# Patient Record
Sex: MALE | Race: WHITE | Employment: UNEMPLOYED | URBAN - METROPOLITAN AREA
[De-identification: names, ages, dates, MRNs, and addresses within clinical notes are randomized per-mention and may not be internally consistent; named-entity substitution may affect disease eponyms.]

---

## 2017-08-08 ENCOUNTER — TELEPHONE (OUTPATIENT)
Dept: FAMILY MEDICINE | Facility: CLINIC | Age: 22
End: 2017-08-08

## 2017-08-08 DIAGNOSIS — E10.9 TYPE 1 DIABETES MELLITUS WITHOUT COMPLICATION (H): ICD-10-CM

## 2017-08-08 NOTE — TELEPHONE ENCOUNTER
Novolog         Last Written Prescription Date: 06/20/2016  Last Fill Quantity: 3, # refills: 3  Last Office Visit with [facility], [facility] or [facility] prescribing provider:  10/28/2016        BP Readings from Last 3 Encounters:   10/28/16 138/84   10/07/16 128/84   08/19/16 111/72     Lab Results   Component Value Date    MICROL 11 05/20/2016     Lab Results   Component Value Date    UMALCR 5.27 05/20/2016     Creatinine   Date Value Ref Range Status   08/18/2016 0.76 0.66 - 1.25 mg/dL Final   ]  GFR Estimate   Date Value Ref Range Status   08/18/2016 >90  Non  GFR Calc   >60 mL/min/1.7m2 Final   05/20/2016 >90  Non  GFR Calc   >60 mL/min/1.7m2 Final   10/15/2015 >90  Non  GFR Calc   >60 mL/min/1.7m2 Final     GFR Estimate If Black   Date Value Ref Range Status   08/18/2016 >90   GFR Calc   >60 mL/min/1.7m2 Final   05/20/2016 >90   GFR Calc   >60 mL/min/1.7m2 Final   10/15/2015 >90   GFR Calc   >60 mL/min/1.7m2 Final     Lab Results   Component Value Date    CHOL 194 10/28/2016     Lab Results   Component Value Date    HDL 50 10/28/2016     Lab Results   Component Value Date     10/28/2016     Lab Results   Component Value Date    TRIG 63 10/28/2016     Lab Results   Component Value Date    CHOLHDLRATIO 4.0 09/03/2013     No results found for: AST  Lab Results   Component Value Date    ALT 22 05/20/2016     Lab Results   Component Value Date    A1C 7.3 10/28/2016    A1C 8.6 05/20/2016    A1C 8.6 09/21/2015    A1C 9.3 01/12/2015    A1C 9.6 09/08/2014     Potassium   Date Value Ref Range Status   08/18/2016 4.2 3.4 - 5.3 mmol/L Final       Lc BRANNON (R)

## 2017-08-10 NOTE — TELEPHONE ENCOUNTER
Dad notified this has been sent and that Tree will need to be seen before that runs out. He expressed understanding.    Marisa Holbrook RN

## 2017-08-10 NOTE — TELEPHONE ENCOUNTER
"Dad calling stating pt is almost out and dad is \"near [facility] and I guess I'll be hanging out here\".    [facility]     "

## 2017-10-28 DIAGNOSIS — E10.9 TYPE 1 DIABETES MELLITUS WITHOUT COMPLICATION (H): ICD-10-CM

## 2017-11-17 ENCOUNTER — TELEPHONE (OUTPATIENT)
Dept: FAMILY MEDICINE | Facility: CLINIC | Age: 22
End: 2017-11-17

## 2017-11-17 DIAGNOSIS — E10.9 TYPE 1 DIABETES MELLITUS WITHOUT COMPLICATION (H): ICD-10-CM

## 2017-11-17 DIAGNOSIS — Z13.6 CARDIOVASCULAR SCREENING; LDL GOAL LESS THAN 100: ICD-10-CM

## 2017-11-17 RX ORDER — SYRINGE-NEEDLE,INSULIN,0.5 ML 31 GX5/16"
SYRINGE, EMPTY DISPOSABLE MISCELLANEOUS
Qty: 900 EACH | Refills: 0 | Status: SHIPPED | OUTPATIENT
Start: 2017-11-17 | End: 2017-12-08

## 2017-11-17 NOTE — TELEPHONE ENCOUNTER
Spoke with pt and he states that he has been taking Levemir 41 units BID for over 6 months.  BS have been averaging 172 over the last 7 days and 198 over 30 day average.      Pt asking for refill of Levemir with this new dosing along with refill of Novolog. Pt scheduled in clinic with Dr. Chiu to discuss his current dosing of medications and for long-term refills.      Please advise.    Amirah ROBLERO RN

## 2017-11-17 NOTE — TELEPHONE ENCOUNTER
Pt calling stating he is going to run out of this soon. He made an appt with Dr Chiu on Friday Dec 1 at 240. He was wondering if we can give him enough until his appt.    insulin aspart (NOVOLOG VIAL) 100 UNITS/ML injection           Last Written Prescription Date: 10/31/17  Last Fill Quantity: 20 ml, # refills: 0  [facility] Visit with [facility], [facility] or [facility] prescribing provider:  10/28/16   Next 5 appointments (look out 90 days)     Dec 01, 2017  2:40 PM CST   [facility]    [address] [facility] [address]   [phone]                   BP Readings from Last 3 Encounters:   10/28/16 138/84   10/07/16 128/84   08/19/16 111/72     Lab Results   Component Value Date    MICROL 11 05/20/2016     Lab Results   Component Value Date    UMALCR 5.27 05/20/2016     Creatinine   Date Value Ref Range Status   08/18/2016 0.76 0.66 - 1.25 mg/dL Final   ]  GFR Estimate   Date Value Ref Range Status   08/18/2016 >90  Non  GFR Calc   >60 mL/min/1.7m2 Final   05/20/2016 >90  Non  GFR Calc   >60 mL/min/1.7m2 Final   10/15/2015 >90  Non  GFR Calc   >60 mL/min/1.7m2 Final     GFR Estimate If Black   Date Value Ref Range Status   08/18/2016 >90   GFR Calc   >60 mL/min/1.7m2 Final   05/20/2016 >90   GFR Calc   >60 mL/min/1.7m2 Final   10/15/2015 >90   GFR Calc   >60 mL/min/1.7m2 Final     Lab Results   Component Value Date    CHOL 194 10/28/2016     Lab Results   Component Value Date    HDL 50 10/28/2016     Lab Results   Component Value Date     10/28/2016     Lab Results   Component Value Date    TRIG 63 10/28/2016     Lab Results   Component Value Date    CHOLHDLRATIO 4.0 09/03/2013     No results found for: AST  Lab Results   Component Value Date    ALT 22 05/20/2016     Lab Results   Component Value Date    A1C 7.3 10/28/2016    A1C 8.6  05/20/2016    A1C 8.6 09/21/2015    A1C 9.3 01/12/2015    A1C 9.6 09/08/2014     Potassium   Date Value Ref Range Status   08/18/2016 4.2 3.4 - 5.3 mmol/L Final         [facility]

## 2017-11-17 NOTE — TELEPHONE ENCOUNTER
Refills are done but be has not seen me in over one year. If he wants me to follow this he needs a clinic appt. .Alfredo Chiu

## 2017-12-02 DIAGNOSIS — Z13.6 CARDIOVASCULAR SCREENING; LDL GOAL LESS THAN 100: ICD-10-CM

## 2017-12-06 ENCOUNTER — APPOINTMENT (OUTPATIENT)
Dept: OCCUPATIONAL MEDICINE | Facility: CLINIC | Age: 22
End: 2017-12-06

## 2017-12-06 PROCEDURE — 99000 SPECIMEN HANDLING OFFICE-LAB: CPT | Performed by: PHYSICIAN ASSISTANT

## 2017-12-07 RX ORDER — INSULIN DETEMIR 100 [IU]/ML
INJECTION, SOLUTION SUBCUTANEOUS
Qty: 60 ML | Refills: 1 | Status: SHIPPED | OUTPATIENT
Start: 2017-12-07 | End: 2017-12-08

## 2017-12-08 ENCOUNTER — OFFICE VISIT (OUTPATIENT)
Dept: FAMILY MEDICINE | Facility: CLINIC | Age: 22
End: 2017-12-08
Payer: COMMERCIAL

## 2017-12-08 VITALS
TEMPERATURE: 99.3 F | DIASTOLIC BLOOD PRESSURE: 86 MMHG | BODY MASS INDEX: 36.88 KG/M2 | SYSTOLIC BLOOD PRESSURE: 141 MMHG | HEART RATE: 101 BPM | WEIGHT: 249 LBS | HEIGHT: 69 IN

## 2017-12-08 DIAGNOSIS — Z13.6 CARDIOVASCULAR SCREENING; LDL GOAL LESS THAN 100: ICD-10-CM

## 2017-12-08 DIAGNOSIS — I10 ESSENTIAL HYPERTENSION: ICD-10-CM

## 2017-12-08 DIAGNOSIS — E10.9 TYPE 1 DIABETES MELLITUS WITHOUT COMPLICATION (H): ICD-10-CM

## 2017-12-08 LAB
ALT SERPL W P-5'-P-CCNC: 51 U/L (ref 0–70)
BASOPHILS # BLD AUTO: 0.1 10E9/L (ref 0–0.2)
BASOPHILS NFR BLD AUTO: 0.8 %
CHOLEST SERPL-MCNC: 163 MG/DL
CREAT SERPL-MCNC: 0.77 MG/DL (ref 0.66–1.25)
CREAT UR-MCNC: 79 MG/DL
DIFFERENTIAL METHOD BLD: NORMAL
EOSINOPHIL # BLD AUTO: 0.1 10E9/L (ref 0–0.7)
EOSINOPHIL NFR BLD AUTO: 1.9 %
ERYTHROCYTE [DISTWIDTH] IN BLOOD BY AUTOMATED COUNT: 12.4 % (ref 10–15)
GFR SERPL CREATININE-BSD FRML MDRD: >90 ML/MIN/1.7M2
HBA1C MFR BLD: 7.2 % (ref 4.3–6)
HCT VFR BLD AUTO: 47.8 % (ref 40–53)
HDLC SERPL-MCNC: 36 MG/DL
HGB BLD-MCNC: 16.4 G/DL (ref 13.3–17.7)
LDLC SERPL CALC-MCNC: 105 MG/DL
LYMPHOCYTES # BLD AUTO: 1.6 10E9/L (ref 0.8–5.3)
LYMPHOCYTES NFR BLD AUTO: 24.1 %
MCH RBC QN AUTO: 31.8 PG (ref 26.5–33)
MCHC RBC AUTO-ENTMCNC: 34.3 G/DL (ref 31.5–36.5)
MCV RBC AUTO: 93 FL (ref 78–100)
MICROALBUMIN UR-MCNC: 8 MG/L
MICROALBUMIN/CREAT UR: 10.48 MG/G CR (ref 0–17)
MONOCYTES # BLD AUTO: 0.8 10E9/L (ref 0–1.3)
MONOCYTES NFR BLD AUTO: 12.8 %
NEUTROPHILS # BLD AUTO: 3.9 10E9/L (ref 1.6–8.3)
NEUTROPHILS NFR BLD AUTO: 60.4 %
NONHDLC SERPL-MCNC: 127 MG/DL
PLATELET # BLD AUTO: 275 10E9/L (ref 150–450)
RBC # BLD AUTO: 5.15 10E12/L (ref 4.4–5.9)
TRIGL SERPL-MCNC: 108 MG/DL
TSH SERPL DL<=0.005 MIU/L-ACNC: 0.86 MU/L (ref 0.4–4)
WBC # BLD AUTO: 6.4 10E9/L (ref 4–11)

## 2017-12-08 PROCEDURE — 36415 COLL VENOUS BLD VENIPUNCTURE: CPT | Performed by: FAMILY MEDICINE

## 2017-12-08 PROCEDURE — 82565 ASSAY OF CREATININE: CPT | Performed by: FAMILY MEDICINE

## 2017-12-08 PROCEDURE — 83036 HEMOGLOBIN GLYCOSYLATED A1C: CPT | Performed by: FAMILY MEDICINE

## 2017-12-08 PROCEDURE — 82043 UR ALBUMIN QUANTITATIVE: CPT | Performed by: FAMILY MEDICINE

## 2017-12-08 PROCEDURE — 85025 COMPLETE CBC W/AUTO DIFF WBC: CPT | Performed by: FAMILY MEDICINE

## 2017-12-08 PROCEDURE — 84443 ASSAY THYROID STIM HORMONE: CPT | Performed by: FAMILY MEDICINE

## 2017-12-08 PROCEDURE — 99214 OFFICE O/P EST MOD 30 MIN: CPT | Performed by: FAMILY MEDICINE

## 2017-12-08 PROCEDURE — 84460 ALANINE AMINO (ALT) (SGPT): CPT | Performed by: FAMILY MEDICINE

## 2017-12-08 PROCEDURE — 80061 LIPID PANEL: CPT | Performed by: FAMILY MEDICINE

## 2017-12-08 RX ORDER — SYRINGE-NEEDLE,INSULIN,0.5 ML 27GX1/2"
SYRINGE, EMPTY DISPOSABLE MISCELLANEOUS
Qty: 540 EACH | Refills: 3 | Status: SHIPPED | OUTPATIENT
Start: 2017-12-08 | End: 2018-07-27

## 2017-12-08 RX ORDER — LISINOPRIL 10 MG/1
10 TABLET ORAL DAILY
Qty: 30 TABLET | Refills: 11 | Status: SHIPPED | OUTPATIENT
Start: 2017-12-08 | End: 2018-05-10

## 2017-12-08 NOTE — PATIENT INSTRUCTIONS
"      Thank you for choosing [facility].  You may be receiving a survey in the mail from [employer] regarding your visit today.  Please take a few minutes to complete and return the survey to let us know how we are doing.      If you have questions or concerns, please contact us via [employer] or you can contact your care team at [phone].    Our Clinic hours are:  Monday 6:40 am  to 7:00 pm  Tuesday -Friday 6:40 am to 5:00 pm    The [place] outpatient lab hours are:  Monday - Friday 6:10 am to 4:45 pm  Saturdays 7:00 am to 11:00 am  Appointments are required, call [phone]    If you have clinical questions after hours or would like to schedule an appointment,  call the clinic at [phone].      (E10.65) Uncontrolled type 1 diabetes mellitus without complication (H)  (primary encounter diagnosis)  Comment:   Plan: Albumin Random Urine Quantitative with Creat         Ratio, ALT, CBC with platelets differential,         Creatinine, Hemoglobin A1c, Lipid panel reflex         to direct LDL Fasting, TSH with free T4 reflex,        lisinopril (PRINIVIL/ZESTRIL) 10 MG tablet,         insulin detemir (LEVEMIR VIAL) 100 UNIT/ML         injection, insulin syringe-needle U-100 (B-D         INSULIN SYRINGE) 31G X 5/16\" 0.5 ML, blood         glucose monitoring (NO BRAND SPECIFIED) test         strip, blood glucose monitoring (ONE TOUCH         DELICA) lancets        We discussed the lower carb diet and daily exercise and weight control. Do the daily foot care. See the eye doctor annually.   We refilled the meds for one year. Do the labs today fasting and we will call the results and recommendations. If the A1c is high then we will reorder it for three months and make the needed adjustments.   We discussed monitoring the sugar in the middle of the sleep cycle. If the sugar is higher than when going to sleep this means the bedtime insulin is too low and raise this by 10 %. If the middle of the sleep cycle sugar is " low, then reduce the bedtime insulin by 10%. Call [phone] to make an appt with our Diabetes RN, Miley. She will help make the small insulin adjustments.     (I10) Essential hypertension  Comment:   Plan: lisinopril (PRINIVIL/ZESTRIL) 10 MG tablet        For the blood pressure, monitor this and record the readings at rest. Use the non drug therapies. Start Lisinopril at 10 mg daily to reduce the BP and it protects the kidney.   The goal for the resting BP average is under 130/80. If this is higher then call the clinic RN at [phone] and the dose of Lisinopril will be increased to 20 mg daily.

## 2017-12-08 NOTE — NURSING NOTE
"Chief Complaint   Patient presents with     Diabetes     Lipids       Initial BP (!) 153/91  Pulse 101  Temp 99.3  F (37.4  C) (Tympanic)  Ht 5' 8.5\" (1.74 m)  Wt 249 lb (112.9 kg)  BMI 37.31 kg/m2 Estimated body mass index is 37.31 kg/(m^2) as calculated from the following:    Height as of this encounter: 5' 8.5\" (1.74 m).    Weight as of this encounter: 249 lb (112.9 kg).  Medication Reconciliation: complete  "

## 2017-12-08 NOTE — MR AVS SNAPSHOT
"              After Visit Summary   12/8/2017    Tree Conti    MRN: [id number]           Patient Information     Date Of Birth          1995        Visit Information        Provider Department      12/8/2017 1:40 PM Alfredo Chiu MD [facility]        Today's Diagnoses     Uncontrolled type 1 diabetes mellitus without complication (H)    -  1    Essential hypertension        CARDIOVASCULAR SCREENING; LDL GOAL LESS THAN 100        Type 1 diabetes mellitus without complication (H)          Care Instructions          Thank you for choosing [facility].  You may be receiving a survey in the mail from [employer] regarding your visit today.  Please take a few minutes to complete and return the survey to let us know how we are doing.      If you have questions or concerns, please contact us via [employer] or you can contact your care team at [phone].    Our Clinic hours are:  Monday 6:40 am  to 7:00 pm  Tuesday -Friday 6:40 am to 5:00 pm    The [place] outpatient lab hours are:  Monday - Friday 6:10 am to 4:45 pm  Saturdays 7:00 am to 11:00 am  Appointments are required, call [phone]    If you have clinical questions after hours or would like to schedule an appointment,  call the clinic at [phone].      (E10.65) Uncontrolled type 1 diabetes mellitus without complication (H)  (primary encounter diagnosis)  Comment:   Plan: Albumin Random Urine Quantitative with Creat         Ratio, ALT, CBC with platelets differential,         Creatinine, Hemoglobin A1c, Lipid panel reflex         to direct LDL Fasting, TSH with free T4 reflex,        lisinopril (PRINIVIL/ZESTRIL) 10 MG tablet,         insulin detemir (LEVEMIR VIAL) 100 UNIT/ML         injection, insulin syringe-needle U-100 (B-D         INSULIN SYRINGE) 31G X 5/16\" 0.5 ML, blood         glucose monitoring (NO BRAND SPECIFIED) test         strip, blood glucose monitoring (ONE TOUCH         DELICA) lancets        We discussed " the lower carb diet and daily exercise and weight control. Do the daily foot care. See the eye doctor annually.   We refilled the meds for one year. Do the labs today fasting and we will call the results and recommendations. If the A1c is high then we will reorder it for three months and make the needed adjustments.   We discussed monitoring the sugar in the middle of the sleep cycle. If the sugar is higher than when going to sleep this means the bedtime insulin is too low and raise this by 10 %. If the middle of the sleep cycle sugar is low, then reduce the bedtime insulin by 10%. Call [phone] to make an appt with our Diabetes RN, Miley. She will help make the small insulin adjustments.     (I10) Essential hypertension  Comment:   Plan: lisinopril (PRINIVIL/ZESTRIL) 10 MG tablet        For the blood pressure, monitor this and record the readings at rest. Use the non drug therapies. Start Lisinopril at 10 mg daily to reduce the BP and it protects the kidney.   The goal for the resting BP average is under 130/80. If this is higher then call the clinic RN at [phone] and the dose of Lisinopril will be increased to 20 mg daily.             Follow-ups after your visit        Who to contact     If you have questions or need follow up information about today's clinic visit or your schedule please contact [facility] directly at [phone].  Normal or non-critical lab and imaging results will be communicated to you by MyChart, letter or phone within 4 business days after the clinic has received the results. If you do not hear from us within 7 days, please contact the clinic through MyChart or phone. If you have a critical or abnormal lab result, we will notify you by phone as soon as possible.  Submit refill requests through [employer] or call your pharmacy and they will forward the refill request to us. Please allow 3 business days for your refill to be completed.          Additional Information About  "Your Visit        [employer] Information     [employer] lets you send messages to your doctor, view your test results, renew your prescriptions, schedule appointments and more. To sign up, go to [email or web address] . Click on \"Log in\" on the left side of the screen, which will take you to the Welcome page. Then click on \"Sign up Now\" on the right side of the page.     You will be asked to enter the access code listed below, as well as some personal information. Please follow the directions to create your username and password.     Your access code is: [email or web address]  Expires: 3/8/2018  2:51 PM     Your access code will  in 90 days. If you need help or a new code, please call your [place] clinic or [phone].        Care EveryWhere ID     This is your Care EveryWhere ID. This could be used by other organizations to access your [place] medical records  [id number]        Your Vitals Were     Pulse Temperature Height BMI (Body Mass Index)          101 99.3  F (37.4  C) (Tympanic) 5' 8.5\" (1.74 m) 37.31 kg/m2         Blood Pressure from Last 3 Encounters:   17 (!) 153/91   10/28/16 138/84   10/07/16 128/84    Weight from Last 3 Encounters:   17 249 lb (112.9 kg)   10/28/16 243 lb (110.2 kg)   10/07/16 244 lb 8 oz (110.9 kg)              We Performed the Following     Albumin Random Urine Quantitative with Creat Ratio     ALT     CBC with platelets differential     Creatinine     Hemoglobin A1c     Lipid panel reflex to direct LDL Fasting     TSH with free T4 reflex          Today's Medication Changes          These changes are accurate as of: 17  2:52 PM.  If you have any questions, ask your nurse or doctor.               Start taking these medicines.        Dose/Directions    lisinopril 10 MG tablet   Commonly known as:  PRINIVIL/ZESTRIL   Used for:  Essential hypertension, Uncontrolled type 1 diabetes mellitus without complication (H)   Started by:  Alfredo Chiu MD        Dose:  10 mg " "  Take 1 tablet (10 mg) by mouth daily   Quantity:  30 tablet   Refills:  11         These medicines have changed or have updated prescriptions.        Dose/Directions    blood glucose monitoring lancets   This may have changed:  Another medication with the same name was removed. Continue taking this medication, and follow the directions you see here.   Used for:  Uncontrolled type 1 diabetes mellitus without complication (H)   Changed by:  Alfredo Chiu MD        Use to test blood sugar 4 times daily or as directed.  Ok to substitute alternative if insurance prefers.   Quantity:  100 each   Refills:  11       * blood glucose monitoring test strip   Commonly known as:  no brand specified   This may have changed:  Another medication with the same name was removed. Continue taking this medication, and follow the directions you see here.   Used for:  Diabetes mellitus type 1 (H)   Changed by:  Kaila Pulido APRN CNP        Use to test blood sugar 3 times daily or as directed.   Quantity:  3 Box   Refills:  3       * blood glucose monitoring test strip   Commonly known as:  no brand specified   This may have changed:  Another medication with the same name was removed. Continue taking this medication, and follow the directions you see here.   Used for:  Uncontrolled type 1 diabetes mellitus without complication (H)   Changed by:  Alfredo Chiu MD        Use to test blood sugar 6 times daily or as directed. (Verio)   Quantity:  200 each   Refills:  11       insulin detemir 100 UNIT/ML injection   Commonly known as:  LEVEMIR VIAL   This may have changed:  See the new instructions.   Used for:  Uncontrolled type 1 diabetes mellitus without complication (H), CARDIOVASCULAR SCREENING; LDL GOAL LESS THAN 100   Changed by:  Alfredo Chiu MD        INJECT 41 UNITS TWICE DAILY UNDER THE SKIN   Quantity:  60 mL   Refills:  3       insulin syringe-needle U-100 31G X 5/16\" 0.5 ML   Commonly known as:  " "B-D INSULIN SYRINGE   This may have changed:  See the new instructions.   Used for:  Uncontrolled type 1 diabetes mellitus without complication (H)   Changed by:  Alfredo Chiu MD        USE ONE SYRINGE 6 X DAILY OR AS DIRECTED.   Quantity:  540 each   Refills:  3       * Notice:  This list has 2 medication(s) that are the same as other medications prescribed for you. Read the directions carefully, and ask your doctor or other care provider to review them with you.      Stop taking these medicines if you haven't already. Please contact your care team if you have questions.     atorvastatin 20 MG tablet   Commonly known as:  LIPITOR   Stopped by:  Alfredo Chiu MD                Where to get your medicines      These medications were sent to [facility]/pharmacy #[id number] - [place], [place] - [facility] [address]  [address] [address], [facility] [address]     Phone:  [phone]     blood glucose monitoring lancets    blood glucose monitoring test strip    insulin aspart 100 UNITS/ML injection    insulin detemir 100 UNIT/ML injection    insulin syringe-needle U-100 31G X 5/16\" 0.5 ML    lisinopril 10 MG tablet                Primary Care Provider Office Phone # Fax #    Alfredo Chiu [phone] [address] [facility] [address]        Equal Access to Services     TONNY KO AH: Hadii carly ku hadasho Soomaali, waaxda luqadaha, qaybta kaalmada adeegyada, waxay atiyain hayaubreyn moni rios. So [facility] [phone].    ATENCIÓN: Si habla español, tiene a chapa disposición servicios gratuitos de asistencia lingüística. Llame al [phone].    We comply with applicable federal civil rights laws and [place] laws. We do not discriminate on the basis of race, color, national origin, age, disability, sex, sexual orientation, or gender identity.            Thank you!     Thank you for choosing [facility]  for your care. Our goal is always to provide you with excellent care. " Hearing back from our patients is one way we can continue to improve our services. Please take a few minutes to complete the written survey that you may receive in the mail after your visit with us. Thank you!             Your Updated Medication List - Protect others around you: Learn how to safely use, store and throw away your medicines at www.disposemymeds.org.          This list is accurate as of: 12/8/17  2:52 PM.  Always use your most recent med list.                   Brand Name Dispense Instructions for use Diagnosis    ASPIRIN NOT PRESCRIBED    INTENTIONAL    0 each    1 each Antiplatelet medication not prescribed intentionally due to Not indicated based on age    Type 1 diabetes, HbA1c goal < 8% (H), Need for prophylactic vaccination and inoculation against influenza, Tobacco abuse, Elevated blood pressure reading without diagnosis of hypertension       blood glucose monitoring lancets     100 each    Use to test blood sugar 4 times daily or as directed.  Ok to substitute alternative if insurance prefers.    Uncontrolled type 1 diabetes mellitus without complication (H)       blood glucose monitoring meter device kit     1 kit    Use to test blood sugars 4 daily or as directed.  Ok to substitute alternative if insurance prefers.    Uncontrolled type 1 diabetes mellitus without complication (H)       * blood glucose monitoring test strip    no brand specified    3 Box    Use to test blood sugar 3 times daily or as directed.    Diabetes mellitus type 1 (H)       * blood glucose monitoring test strip    no brand specified    200 each    Use to test blood sugar 6 times daily or as directed. (Verio)    Uncontrolled type 1 diabetes mellitus without complication (H)       insulin aspart 100 UNITS/ML injection    NovoLOG VIAL    36 mL    USE 1 UNIT EVERY 10 GRAMS CARB. 1 UNIT FOR EVERY 50 POINTS OVER 150. UP TO 50 UNITS DAILY (Needs follow-up appointment for this medication)    Type 1 diabetes mellitus without  "complication (H)       insulin detemir 100 UNIT/ML injection    LEVEMIR VIAL    60 mL    INJECT 41 UNITS TWICE DAILY UNDER THE SKIN    Uncontrolled type 1 diabetes mellitus without complication (H), CARDIOVASCULAR SCREENING; LDL GOAL LESS THAN 100       insulin syringe-needle U-100 31G X 5/16\" 0.5 ML    B-D INSULIN SYRINGE    540 each    USE ONE SYRINGE 6 X DAILY OR AS DIRECTED.    Uncontrolled type 1 diabetes mellitus without complication (H)       lisinopril 10 MG tablet    PRINIVIL/ZESTRIL    30 tablet    Take 1 tablet (10 mg) by mouth daily    Essential hypertension, Uncontrolled type 1 diabetes mellitus without complication (H)       STATIN NOT PRESCRIBED (INTENTIONAL)     0 each    Statin not prescribed intentionally due to Not indicated based on age    Type 1 diabetes, HbA1c goal < 8% (H), Need for prophylactic vaccination and inoculation against influenza, Tobacco abuse, Elevated blood pressure reading without diagnosis of hypertension       * Notice:  This list has 2 medication(s) that are the same as other medications prescribed for you. Read the directions carefully, and ask your doctor or other care provider to review them with you.      "

## 2017-12-08 NOTE — LETTER
December 11, 2017      Tree Conti  [address] [place] [id number]        Dear ,    We are writing to inform you of your test results.  These are good except the LDL is a bit high. He is 105 and the goal is under 100.   Use the lower chol diet very carefully. The A1c is stable  If you have any questions or concerns, please call the clinic at the number listed above.       Sincerely,        Alfredo Chiu MD/cb

## 2017-12-08 NOTE — PROGRESS NOTES
SUBJECTIVE:   Tree Conti is a 22 year old male who presents to clinic today for the following health issues:      Diabetes Follow-up  Levemir is currently at 41 units BID.      Patient is checking blood sugars: 4-8 times per day.    He works the night shift.  When waking up his sugars can be higher around 100-250.  He will try to take the Novolog to adjust that.  Before lunch/dinner it can be around 100.  Before going to bed it can range around 100-200.    Diabetic concerns: other - He changed from Lantus to Levemir due to his Pharmacy talking about cost.  He feels the Lantus helped to control his blood sugars better.  Feels he is using more of the Levemir and that is not working as well.     Symptoms of hypoglycemia (low blood sugar): Not very often.     Paresthesias (numbness or burning in feet) or sores: No     Date of last diabetic eye exam: Done in the last year.    BP Readings from Last 2 Encounters:   12/08/17 141/86   10/28/16 138/84     Hemoglobin A1C (%)   Date Value   10/28/2016 7.3 (H)   05/20/2016 8.6 (H)     LDL Cholesterol Calculated (mg/dL)   Date Value   10/28/2016 131 (H)   05/20/2016 125 (H)     Hyperlipidemia Follow-Up      Rate your low fat/cholesterol diet?: fair    Taking statin?  No, patient doesn't want to take this.    Other lipid medications/supplements?:  none      BLOOD PRESSURE:  Blood pressure is higher today.  He states he is not sure if it is related to smoking and drinking.  He has about 5-6 drinks 4-5 times per week.      Amount of exercise or physical activity: Not as much.  Just walking at work.    Problems taking medications regularly: No    Medication side effects: none  Diet: low fat/cholesterol-fair and carbohydrate counting-tries to watch.        Current Outpatient Prescriptions:      lisinopril (PRINIVIL/ZESTRIL) 10 MG tablet, Take 1 tablet (10 mg) by mouth daily, Disp: 30 tablet, Rfl: 11     insulin detemir (LEVEMIR VIAL) 100 UNIT/ML injection, INJECT 41 UNITS TWICE  "DAILY UNDER THE SKIN, Disp: 60 mL, Rfl: 3     insulin aspart (NOVOLOG VIAL) 100 UNITS/ML injection, USE 1 UNIT EVERY 10 GRAMS CARB. 1 UNIT FOR EVERY 50 POINTS OVER 150. UP TO 50 UNITS DAILY (Needs follow-up appointment for this medication), Disp: 36 mL, Rfl: 3     insulin syringe-needle U-100 (B-D INSULIN SYRINGE) 31G X 5/16\" 0.5 ML, USE ONE SYRINGE 6 X DAILY OR AS DIRECTED., Disp: 540 each, Rfl: 3     blood glucose monitoring (NO BRAND SPECIFIED) test strip, Use to test blood sugar 6 times daily or as directed. (Verio), Disp: 200 each, Rfl: 11     blood glucose monitoring (ONE TOUCH DELICA) lancets, Use to test blood sugar 4 times daily or as directed.  Ok to substitute alternative if insurance prefers., Disp: 100 each, Rfl: 11     blood glucose monitoring (ONETOUCH VERIO SYNC SYSTEM) meter device kit, Use to test blood sugars 4 daily or as directed.  Ok to substitute alternative if insurance prefers., Disp: 1 kit, Rfl: 1     blood glucose monitoring (NO BRAND SPECIFIED) test strip, Use to test blood sugar 3 times daily or as directed., Disp: 3 Box, Rfl: 3     ASPIRIN NOT PRESCRIBED, INTENTIONAL,, 1 each Antiplatelet medication not prescribed intentionally due to Not indicated based on age, Disp: 0 each, Rfl: 0     STATIN NOT PRESCRIBED, INTENTIONAL,, Statin not prescribed intentionally due to Not indicated based on age, Disp: 0 each, Rfl: 0     [DISCONTINUED] LEVEMIR VIAL 100 UNIT/ML soln, INJECT 30 UNITS TWICE DAILY UNDER THE SKIN, Disp: 60 mL, Rfl: 1     [DISCONTINUED] insulin aspart (NOVOLOG VIAL) 100 UNITS/ML injection, USE 1 UNIT EVERY 10 GRAMS CARB. 1 UNIT FOR EVERY 50 POINTS OVER 150. UP TO 50 UNITS DAILY (Needs follow-up appointment for this medication), Disp: 20 mL, Rfl: 0     [DISCONTINUED] insulin detemir (LEVEMIR FLEXPEN/FLEXTOUCH) 100 UNIT/ML injection, Use 37 units twice a day (Patient not taking: Reported on 12/8/2017), Disp: 15 mL, Rfl: 11     [DISCONTINUED] ORDER FOR DME, Test strips for pt's " "glucometer, brand as covered by insurance. Test 6-8 times daily and prn., Disp: 750 each, Rfl: 4    Patient Active Problem List   Diagnosis     Celiac disease     CARDIOVASCULAR SCREENING; LDL GOAL LESS THAN 100     Tobacco abuse     SVT (supraventricular tachycardia) (H)     Uncontrolled type 1 diabetes mellitus without complication (H)     Essential hypertension       Blood pressure 141/86, pulse 101, temperature 99.3  F (37.4  C), temperature source Tympanic, height 5' 8.5\" (1.74 m), weight 249 lb (112.9 kg).    Exam:  GENERAL APPEARANCE: over weight  EYES: EOMI,  PERRL  NECK: no adenopathy, no asymmetry, masses, or scars and thyroid normal to palpation  RESP: lungs clear to auscultation - no rales, rhonchi or wheezes  CV: regular rates and rhythm, normal S1 S2, no S3 or S4 and no murmur, click or rub -  SKIN: no suspicious lesions or rashes  PSYCH: mentation appears normal and affect normal/bright      (E10.65) Uncontrolled type 1 diabetes mellitus without complication (H)  (primary encounter diagnosis)  Comment:   Plan: Albumin Random Urine Quantitative with Creat         Ratio, ALT, CBC with platelets differential,         Creatinine, Hemoglobin A1c, Lipid panel reflex         to direct LDL Fasting, TSH with free T4 reflex,        lisinopril (PRINIVIL/ZESTRIL) 10 MG tablet,         insulin detemir (LEVEMIR VIAL) 100 UNIT/ML         injection, insulin syringe-needle U-100 (B-D         INSULIN SYRINGE) 31G X 5/16\" 0.5 ML, blood         glucose monitoring (NO BRAND SPECIFIED) test         strip, blood glucose monitoring (ONE TOUCH         DELICA) lancets        We discussed the lower carb diet and daily exercise and weight control. Do the daily foot care. See the eye doctor annually.   We refilled the meds for one year. Do the labs today fasting and we will call the results and recommendations. If the A1c is high then we will reorder it for three months and make the needed adjustments.   We discussed monitoring " the sugar in the middle of the sleep cycle. If the sugar is higher than when going to sleep this means the bedtime insulin is too low and raise this by 10 %. If the middle of the sleep cycle sugar is low, then reduce the bedtime insulin by 10%. Call [phone] to make an appt with our Diabetes RN, Miley. She will help make the small insulin adjustments.     (I10) Essential hypertension  Comment:   Plan: lisinopril (PRINIVIL/ZESTRIL) 10 MG tablet        For the blood pressure, monitor this and record the readings at rest. Use the non drug therapies. Start Lisinopril at 10 mg daily to reduce the BP and it protects the kidney.   The goal for the resting BP average is under 130/80. If this is higher then call the clinic RN at [phone] and the dose of Lisinopril will be increased to 20 mg daily.       Alfredo Chiu

## 2018-02-12 NOTE — TELEPHONE ENCOUNTER
Last Written Prescription Date:  12/8/17  Last Fill Quantity: 200,  # refills: 11   Last office visit: No previous visit found with prescribing provider:  Carri     Future Office Visit:

## 2018-02-13 RX ORDER — BLOOD SUGAR DIAGNOSTIC
STRIP MISCELLANEOUS
Qty: 600 STRIP | Refills: 2 | Status: SHIPPED | OUTPATIENT
Start: 2018-02-13 | End: 2018-07-27

## 2018-05-10 ENCOUNTER — HOSPITAL ENCOUNTER (EMERGENCY)
Facility: CLINIC | Age: 23
Discharge: HOME | End: 2018-05-10
Attending: EMERGENCY MEDICINE | Admitting: EMERGENCY MEDICINE
Payer: COMMERCIAL

## 2018-05-10 VITALS
RESPIRATION RATE: 18 BRPM | TEMPERATURE: 98 F | OXYGEN SATURATION: 98 % | WEIGHT: 250 LBS | SYSTOLIC BLOOD PRESSURE: 149 MMHG | HEIGHT: 68 IN | DIASTOLIC BLOOD PRESSURE: 88 MMHG | BODY MASS INDEX: 37.89 KG/M2

## 2018-05-10 DIAGNOSIS — R07.89 CHEST WALL PAIN: ICD-10-CM

## 2018-05-10 LAB
ALBUMIN SERPL-MCNC: 3.4 G/DL (ref 3.4–5)
ALP SERPL-CCNC: 104 U/L (ref 40–150)
ALT SERPL W P-5'-P-CCNC: 29 U/L (ref 0–70)
ANION GAP SERPL CALCULATED.3IONS-SCNC: 5 MMOL/L (ref 3–14)
AST SERPL W P-5'-P-CCNC: 25 U/L (ref 0–45)
BASOPHILS # BLD AUTO: 0 10E9/L (ref 0–0.2)
BASOPHILS NFR BLD AUTO: 0.4 %
BILIRUB SERPL-MCNC: 0.5 MG/DL (ref 0.2–1.3)
BUN SERPL-MCNC: 13 MG/DL (ref 7–30)
CALCIUM SERPL-MCNC: 8.1 MG/DL (ref 8.5–10.1)
CHLORIDE SERPL-SCNC: 108 MMOL/L (ref 94–109)
CO2 SERPL-SCNC: 24 MMOL/L (ref 20–32)
CREAT SERPL-MCNC: 0.84 MG/DL (ref 0.66–1.25)
D DIMER PPP FEU-MCNC: 0.3 UG/ML FEU (ref 0–0.5)
DIFFERENTIAL METHOD BLD: NORMAL
EOSINOPHIL # BLD AUTO: 0.3 10E9/L (ref 0–0.7)
EOSINOPHIL NFR BLD AUTO: 3 %
ERYTHROCYTE [DISTWIDTH] IN BLOOD BY AUTOMATED COUNT: 12.3 % (ref 10–15)
GFR SERPL CREATININE-BSD FRML MDRD: >90 ML/MIN/1.7M2
GLUCOSE SERPL-MCNC: 196 MG/DL (ref 70–99)
HCT VFR BLD AUTO: 48.9 % (ref 40–53)
HGB BLD-MCNC: 16.9 G/DL (ref 13.3–17.7)
IMM GRANULOCYTES # BLD: 0 10E9/L (ref 0–0.4)
IMM GRANULOCYTES NFR BLD: 0.2 %
LIPASE SERPL-CCNC: 51 U/L (ref 73–393)
LYMPHOCYTES # BLD AUTO: 1.6 10E9/L (ref 0.8–5.3)
LYMPHOCYTES NFR BLD AUTO: 19.5 %
MCH RBC QN AUTO: 30.6 PG (ref 26.5–33)
MCHC RBC AUTO-ENTMCNC: 34.6 G/DL (ref 31.5–36.5)
MCV RBC AUTO: 88 FL (ref 78–100)
MONOCYTES # BLD AUTO: 0.8 10E9/L (ref 0–1.3)
MONOCYTES NFR BLD AUTO: 9 %
NEUTROPHILS # BLD AUTO: 5.7 10E9/L (ref 1.6–8.3)
NEUTROPHILS NFR BLD AUTO: 67.9 %
PLATELET # BLD AUTO: 279 10E9/L (ref 150–450)
POTASSIUM SERPL-SCNC: 4.6 MMOL/L (ref 3.4–5.3)
PROT SERPL-MCNC: 7.4 G/DL (ref 6.8–8.8)
RBC # BLD AUTO: 5.53 10E12/L (ref 4.4–5.9)
SODIUM SERPL-SCNC: 137 MMOL/L (ref 133–144)
TROPONIN I SERPL-MCNC: <0.015 UG/L (ref 0–0.04)
WBC # BLD AUTO: 8.3 10E9/L (ref 4–11)

## 2018-05-10 PROCEDURE — 83690 ASSAY OF LIPASE: CPT | Performed by: EMERGENCY MEDICINE

## 2018-05-10 PROCEDURE — 85025 COMPLETE CBC W/AUTO DIFF WBC: CPT | Performed by: EMERGENCY MEDICINE

## 2018-05-10 PROCEDURE — 84484 ASSAY OF TROPONIN QUANT: CPT | Performed by: EMERGENCY MEDICINE

## 2018-05-10 PROCEDURE — 99284 EMERGENCY DEPT VISIT MOD MDM: CPT | Performed by: EMERGENCY MEDICINE

## 2018-05-10 PROCEDURE — 85379 FIBRIN DEGRADATION QUANT: CPT | Performed by: EMERGENCY MEDICINE

## 2018-05-10 PROCEDURE — 80053 COMPREHEN METABOLIC PANEL: CPT | Performed by: EMERGENCY MEDICINE

## 2018-05-10 PROCEDURE — 93010 ELECTROCARDIOGRAM REPORT: CPT | Mod: Z6 | Performed by: EMERGENCY MEDICINE

## 2018-05-10 PROCEDURE — 93005 ELECTROCARDIOGRAM TRACING: CPT | Performed by: EMERGENCY MEDICINE

## 2018-05-10 PROCEDURE — 99284 EMERGENCY DEPT VISIT MOD MDM: CPT | Mod: 25 | Performed by: EMERGENCY MEDICINE

## 2018-05-10 NOTE — ED AVS SNAPSHOT
[facility]    [address] [facility] [address]    Phone:  [phone]    Fax:  [phone]                                       Tree Conti   MRN: [id number]    Department:  [facility]   Date of Visit:  5/10/2018           Patient Information     Date Of Birth          1995        Your diagnoses for this visit were:     Chest wall pain        You were seen by Roni Alex MD.      Follow-up Information     Follow up with Alfredo Chiu MD.    Specialty:  Family Practice    Why:  As needed    Contact information:    [address] [facility] [address]  [phone]          Discharge Instructions         Chest Wall Pain: Costochondritis    The chest pain that you have had today is caused by costochondritis. This condition is caused by an inflammation of the cartilage joining your ribs to your breastbone. It is not caused by heart or lung problems. Your healthcare team has made sure that the chest pain you feel is not from a life threatening cause of chest pain such as heart attack, collapsed lung, blood clot in the lung, tear in the aorta, or esophageal rupture. The inflammation may have been brought on by a blow to the chest, lifting heavy objects, intense exercise, or an illness that made you cough and sneeze a lot. It often occurs during times of emotional stress. It can be painful, but it is not dangerous. It usually goes away in 1 to 2 weeks. But it may happen again. Rarely, a more serious condition may cause symptoms similar to costochondritis. That s why it s important to watch for the warning signs listed below.  Home care  Follow these guidelines when caring for yourself at home:    If you feel that emotional stress is a cause of your condition, try to figure out the sources of that stress. It may not be obvious. Learn ways to deal with the stress in your life. This can include regular exercise, muscle relaxation, meditation,  or simply taking time out for yourself.    You may use acetaminophen, ibuprofen, or naproxen to control pain, unless another pain medicine was prescribed. If you have liver or kidney disease or ever had a stomach ulcer, talk with your healthcare provider before using these medicines.    You can also help ease pain by using a hot, wet compress or heating pad. Use this with or without a medicated skin cream that helps relieves pain.    Do stretching exercise as advised by your provider.    Take any prescribed medicines as directed.  Follow-up care  Follow up with your healthcare provider, or as advised, if you do not start to get better in the next 2 days.  When to seek medical advice  Call your healthcare provider right away if any of these occur:    A change in the type of pain. Call if it feels different, becomes more serious, lasts longer, or spreads into your shoulder, arm, neck, jaw, or back.    Shortness of breath or pain gets worse when you breathe    Weakness, dizziness, or fainting    Cough with dark-colored sputum (phlegm) or blood    Abdominal pain    Dark red or black stools    Fever of 100.4 F (38 C) or higher, or as directed by your healthcare provider  Date Last Reviewed: 12/1/2016 2000-2017 [employer]. [address] [place] [address]. All rights reserved. This information is not intended as a substitute for professional medical care. Always follow your healthcare professional's instructions.          24 Hour Appointment Hotline       To make an appointment at any [facility], call [phone] ([phone]). If you don't have a family doctor or clinic, we will help you find one. [facility] clinics are conveniently located to serve the needs of you and your family.             Review of your medicines      Our records show that you are taking the medicines listed below. If these are incorrect, please call your family doctor or clinic.        Dose / Directions Last  "dose taken    ASPIRIN NOT PRESCRIBED   Commonly known as:  INTENTIONAL   Dose:  1 each   Quantity:  0 each        1 each Antiplatelet medication not prescribed intentionally due to Not indicated based on age   Refills:  0        BENADRYL PO   Dose:  25 mg        Take 25 mg by mouth daily as needed   Refills:  0        blood glucose monitoring lancets   Quantity:  100 each        Use to test blood sugar 4 times daily or as directed.  Ok to substitute alternative if insurance prefers.   Refills:  11        blood glucose monitoring meter device kit   Quantity:  1 kit        Use to test blood sugars 4 daily or as directed.  Ok to substitute alternative if insurance prefers.   Refills:  1        insulin aspart 100 UNITS/ML injection   Commonly known as:  NovoLOG VIAL   Quantity:  36 mL        USE 1 UNIT EVERY 10 GRAMS CARB. 1 UNIT FOR EVERY 50 POINTS OVER 150. UP TO 50 UNITS DAILY (Needs follow-up appointment for this medication)   Refills:  3        insulin detemir 100 UNIT/ML injection   Commonly known as:  LEVEMIR VIAL   Quantity:  60 mL        INJECT 41 UNITS TWICE DAILY UNDER THE SKIN   Refills:  3        insulin syringe-needle U-100 31G X 5/16\" 0.5 ML   Commonly known as:  B-D INSULIN SYRINGE   Quantity:  540 each        USE ONE SYRINGE 6 X DAILY OR AS DIRECTED.   Refills:  3        ONETOUCH VERIO IQ test strip   Quantity:  600 strip   Generic drug:  blood glucose monitoring        USE TO TEST BLOOD SUGAR 6 TIMES DAILY OR AS DIRECTED. (VERIO)   Refills:  2        STATIN NOT PRESCRIBED (INTENTIONAL)   Quantity:  0 each        Statin not prescribed intentionally due to Not indicated based on age   Refills:  0                Procedures and tests performed during your visit     CBC with platelets differential    Comprehensive metabolic panel    D dimer quantitative    EKG 12-lead, tracing only    Lipase    Troponin I      Orders Needing Specimen Collection     None      Pending Results     No orders found from 5/8/2018 " to 5/11/2018.            Pending Culture Results     No orders found from 5/8/2018 to 5/11/2018.            Pending Results Instructions     If you had any lab results that were not finalized at the time of your Discharge, you can call the ED Lab Result RN at [phone]. You will be contacted by this team for any positive Lab results or changes in treatment. The nurses are available 7 days a week from 10A to 6:30P.  You can leave a message 24 hours per day and they will return your call.        Test Results From Your Hospital Stay        5/10/2018  4:44 PM      Component Results     Component Value Ref Range & Units Status    WBC 8.3 4.0 - 11.0 10e9/L Final    RBC Count 5.53 4.4 - 5.9 10e12/L Final    Hemoglobin 16.9 13.3 - 17.7 g/dL Final    Hematocrit 48.9 40.0 - 53.0 % Final    MCV 88 78 - 100 fl Final    MCH 30.6 26.5 - 33.0 pg Final    MCHC 34.6 31.5 - 36.5 g/dL Final    RDW 12.3 10.0 - 15.0 % Final    Platelet Count 279 150 - 450 10e9/L Final    Diff Method Automated Method  Final    % Neutrophils 67.9 % Final    % Lymphocytes 19.5 % Final    % Monocytes 9.0 % Final    % Eosinophils 3.0 % Final    % Basophils 0.4 % Final    % Immature Granulocytes 0.2 % Final    Absolute Neutrophil 5.7 1.6 - 8.3 10e9/L Final    Absolute Lymphocytes 1.6 0.8 - 5.3 10e9/L Final    Absolute Monocytes 0.8 0.0 - 1.3 10e9/L Final    Absolute Eosinophils 0.3 0.0 - 0.7 10e9/L Final    Absolute Basophils 0.0 0.0 - 0.2 10e9/L Final    Abs Immature Granulocytes 0.0 0 - 0.4 10e9/L Final         5/10/2018  4:44 PM      Component Results     Component Value Ref Range & Units Status    D Dimer 0.3 0.0 - 0.50 ug/ml FEU Final    This D-dimer assay is intended for use in conjunction with a clinical pretest   probability assessment model to exclude pulmonary embolism (PE) and deep   venous thrombosis (DVT) in outpatients suspected of PE or DVT. The cut-off   value is 0.5 ug/mL FEU.           5/10/2018  5:25 PM      Component Results      Component Value Ref Range & Units Status    Sodium 137 133 - 144 mmol/L Final    Potassium 4.6 3.4 - 5.3 mmol/L Final    Specimen slightly hemolyzed, potassium may be falsely elevated    Chloride 108 94 - 109 mmol/L Final    Carbon Dioxide 24 20 - 32 mmol/L Final    Anion Gap 5 3 - 14 mmol/L Final    Glucose 196 (H) 70 - 99 mg/dL Final    Urea Nitrogen 13 7 - 30 mg/dL Final    Creatinine 0.84 0.66 - 1.25 mg/dL Final    GFR Estimate >90 >60 mL/min/1.7m2 Final    Non  GFR Calc    GFR Estimate If Black >90 >60 mL/min/1.7m2 Final    African American GFR Calc    Calcium 8.1 (L) 8.5 - 10.1 mg/dL Final    Bilirubin Total 0.5 0.2 - 1.3 mg/dL Final    Albumin 3.4 3.4 - 5.0 g/dL Final    Protein Total 7.4 6.8 - 8.8 g/dL Final    Alkaline Phosphatase 104 40 - 150 U/L Final    ALT 29 0 - 70 U/L Final    AST 25 0 - 45 U/L Final    Specimen is hemolyzed which can falsely elevate AST. Analysis of a   non-hemolyzed specimen may result in a lower value.           5/10/2018  5:25 PM      Component Results     Component Value Ref Range & Units Status    Lipase 51 (L) 73 - 393 U/L Final         5/10/2018  5:25 PM      Component Results     Component Value Ref Range & Units Status    Troponin I ES <0.015 0.000 - 0.045 ug/L Final    The 99th percentile for upper reference range is 0.045 ug/L.  Troponin values   in the range of 0.045 - 0.120 ug/L may be associated with risks of adverse   clinical events.                  Thank you for choosing [facility]       Thank you for choosing [facility] for your care. Our goal is always to provide you with excellent care. Hearing back from our patients is one way we can continue to improve our services. Please take a few minutes to complete the written survey that you may receive in the mail after you visit with us. Thank you!        MyChart Information     [facility] lets you send messages to your doctor, view your test results, renew your prescriptions, schedule appointments and more. To  "sign up, go to [email or web address] . Click on \"Log in\" on the left side of the screen, which will take you to the Welcome page. Then click on \"Sign up Now\" on the right side of the page.     You will be asked to enter the access code listed below, as well as some personal information. Please follow the directions to create your username and password.     Your access code is: [email or web address]  Expires: 2018  5:36 PM     Your access code will  in 90 days. If you need help or a new code, please call your [facility] clinic or [phone].        Care EveryWhere ID     This is your Care EveryWhere ID. This could be used by other organizations to access your [facility] medical records  [id number]        Equal Access to Services     TONNY KO : Espinoza Early, jacoby devine, conrad solitario, nigel rios. So [facility] [phone].    ATENCIÓN: Si habla español, tiene a chapa disposición servicios gratuitos de asistencia lingüística. Wil al [phone].    We comply with applicable federal civil rights laws and [place] laws. We do not discriminate on the basis of race, color, national origin, age, disability, sex, sexual orientation, or gender identity.            After Visit Summary       This is your record. Keep this with you and show to your community pharmacist(s) and doctor(s) at your next visit.                  "

## 2018-05-10 NOTE — ED PROVIDER NOTES
History     Chief Complaint   Patient presents with     Chest Pain     intermittent chest pain that is very intense; usually resolves within 1 minute; getting more frequent over the past 2 weeks     HPI  Tree Conti is a 22 year old male who presents with intermittent moderate migratory chest pain for the last few weeks.  He describes a sharp pain in the chest which lasts less than a minute but then may recur briefly thereafter.  Pain is worse if he takes a deep breath or pushes on the area.  Pain today was located in the left lower sternal area. Denies fever or chills.  Has not had cough or shortness of breath.  He denies abdominal pain, nausea or vomiting.  No history of acid reflux but does have celiac disease.  He said no diarrhea.  History of SVT with ablation.  No recurrence since that time.  Does have other cardiac risk factors including type 2 diabetes and hypertension.  Also is a daily smoker.  Does admit to heavy lifting up to 75 pounds at work.  Denies any leg pain or swelling.  No history of DVT or PE.  Currently his pain is moderate and reproducible on the left chest.  Said no skin rashes over the area.  Denies history of shingles.  Patient has no acute neurologic symptoms or headache.    Problem List:    Patient Active Problem List    Diagnosis Date Noted     Essential hypertension 12/08/2017     Priority: Medium     Uncontrolled type 1 diabetes mellitus without complication (H) 10/28/2016     Priority: Medium     SVT (supraventricular tachycardia) (H) 06/10/2016     Priority: Medium     See Cardiac monitor, May, 2016. Referral to Cardiology, and instructions were done.        Tobacco abuse 09/21/2015     Priority: Medium     CARDIOVASCULAR SCREENING; LDL GOAL LESS THAN 100 03/27/2013     Priority: Medium     Celiac disease 09/23/2010     Priority: Medium        Past Medical History:    Past Medical History:   Diagnosis Date     Celiac disease      Diabetes mellitus (H)      Paroxysmal  "supraventricular tachycardia (H)        Past Surgical History:    Past Surgical History:   Procedure Laterality Date     EP ABLATION / EP STUDIES  08/18/2016    AVRT ablation     HC TOOTH EXTRACTION W/FORCEP      april       Family History:    Family History   Problem Relation Age of Onset     HEART DISEASE Mother        Social History:  Marital Status:  Single [1]  Social History   Substance Use Topics     Smoking status: Current Every Day Smoker     Packs/day: 0.25     Types: Cigarettes     Smokeless tobacco: Former User      Comment: 1/4 to 1/2 ppd.     Alcohol use 0.0 oz/week     0 Standard drinks or equivalent per week      Comment: 4-5 times per week, 5-6 drinks per time.        Medications:      blood glucose monitoring (ONE TOUCH DELICA) lancets   blood glucose monitoring (ONETOUCH VERIO SYNC SYSTEM) meter device kit   DiphenhydrAMINE HCl (BENADRYL PO)   insulin aspart (NOVOLOG VIAL) 100 UNITS/ML injection   insulin detemir (LEVEMIR VIAL) 100 UNIT/ML injection   insulin syringe-needle U-100 (B-D INSULIN SYRINGE) 31G X 5/16\" 0.5 ML   ONETOUCH VERIO IQ test strip   ASPIRIN NOT PRESCRIBED, INTENTIONAL,   STATIN NOT PRESCRIBED, INTENTIONAL,   [DISCONTINUED] ORDER FOR DME         Review of Systems all other systems reviewed and are negative.    Physical Exam   BP: (!) 181/104  Heart Rate: 110  Temp: 98  F (36.7  C)  Resp: 18  Height: 172.7 cm (5' 8\")  Weight: 113.4 kg (250 lb)  SpO2: 98 %      Physical Exam general alert cooperative male who is somewhat anxious.  HEENT shows no proptosis.  Speech is clear and concise.  Neck is supple without bruits or thyromegaly.  Lungs are clear without adventitious sounds.  Cardiac regular rate without murmur.  He has reproducible chest pain in the left costal margin on the lower aspect.  No skin rash of the area.  No crepitus or step-off is noted.  Abdomen is obese with active bowel sounds.  9 to palpation.  There is no organomegaly.  Extremities reveal no edema, calf or " thigh tenderness, and Homans is negative.    ED Course     ED Course     Procedures               EKG Interpretation:      Interpreted by Roni Alex  Time reviewed: 3:00  Symptoms at time of EKG: Left lower chest pain  Rhythm: normal sinus   Rate: Tachycardia  Axis: Normal  Ectopy: none  Conduction: normal  ST Segments/ T Waves: No acute ischemic changes  Q Waves: inferior leads  Comparison to prior: Unchanged from 10/15/15    Clinical Impression: normal EKG                Critical Care time:  none               Results for orders placed or performed during the hospital encounter of 05/10/18 (from the past 24 hour(s))   CBC with platelets differential   Result Value Ref Range    WBC 8.3 4.0 - 11.0 10e9/L    RBC Count 5.53 4.4 - 5.9 10e12/L    Hemoglobin 16.9 13.3 - 17.7 g/dL    Hematocrit 48.9 40.0 - 53.0 %    MCV 88 78 - 100 fl    MCH 30.6 26.5 - 33.0 pg    MCHC 34.6 31.5 - 36.5 g/dL    RDW 12.3 10.0 - 15.0 %    Platelet Count 279 150 - 450 10e9/L    Diff Method Automated Method     % Neutrophils 67.9 %    % Lymphocytes 19.5 %    % Monocytes 9.0 %    % Eosinophils 3.0 %    % Basophils 0.4 %    % Immature Granulocytes 0.2 %    Absolute Neutrophil 5.7 1.6 - 8.3 10e9/L    Absolute Lymphocytes 1.6 0.8 - 5.3 10e9/L    Absolute Monocytes 0.8 0.0 - 1.3 10e9/L    Absolute Eosinophils 0.3 0.0 - 0.7 10e9/L    Absolute Basophils 0.0 0.0 - 0.2 10e9/L    Abs Immature Granulocytes 0.0 0 - 0.4 10e9/L   D dimer quantitative   Result Value Ref Range    D Dimer 0.3 0.0 - 0.50 ug/ml FEU   Comprehensive metabolic panel   Result Value Ref Range    Sodium 137 133 - 144 mmol/L    Potassium 4.6 3.4 - 5.3 mmol/L    Chloride 108 94 - 109 mmol/L    Carbon Dioxide 24 20 - 32 mmol/L    Anion Gap 5 3 - 14 mmol/L    Glucose 196 (H) 70 - 99 mg/dL    Urea Nitrogen 13 7 - 30 mg/dL    Creatinine 0.84 0.66 - 1.25 mg/dL    GFR Estimate >90 >60 mL/min/1.7m2    GFR Estimate If Black >90 >60 mL/min/1.7m2    Calcium 8.1 (L) 8.5 - 10.1 mg/dL     Bilirubin Total 0.5 0.2 - 1.3 mg/dL    Albumin 3.4 3.4 - 5.0 g/dL    Protein Total 7.4 6.8 - 8.8 g/dL    Alkaline Phosphatase 104 40 - 150 U/L    ALT 29 0 - 70 U/L    AST 25 0 - 45 U/L   Lipase   Result Value Ref Range    Lipase 51 (L) 73 - 393 U/L   Troponin I   Result Value Ref Range    Troponin I ES <0.015 0.000 - 0.045 ug/L       Medications - No data to display  IV was established and blood work was obtained.  EKG is obtained and reviewed as above.  Discussed results the patient's blood work showing above.  Assessments & Plan (with Medical Decision Making)   Patient is a 22-year-old male presents with migratory intermittent moderate chest pain that he has had for a few weeks.  He describes pain as sharp and most currently is located in the left lower sternal area.  Pain is worse with coughing or inspiration.  Also worse with palpation.  Denies recent illness.  No fever chills.  No cough or shortness of breath.  Denies abdominal pain, nausea or vomiting.  No history of reflux.  Does have celiac disease but that is currently quiescent.  He said previous SVT with ablation.  No recurrence since that time.  He does have cardiac risk factors including diabetes and hypertension.  He is a daily smoker.  He does admit that he lifts up to 75 pounds of weight at work.  No leg pain or swelling.  No history of DVT or PE.  He has no skin rash over the area and no history of shingles.  No acute neurologic changes or headache.  On presentation patient was mildly tachycardic.  He was initially hypertensive but that improved without treatment.  He was afebrile and not hypoxic.  Patient had no proptosis.  Speech is clear and concise.  Neck was supple.  No thyromegaly.  Lungs are clear without adventitious sounds.  Cardiac regular rate without murmur.  Chest wall had reproducible tenderness of the left sternal border without crepitus or step-off.  Abdomen was obese but soft and benign.  Extremities reveal no edema, calf or thigh  tenderness.  Homans is negative.  EKG showed no acute ischemic changes and no prescription change from previous.  Blood work was normal including d-dimer and troponin.  No evidence of hepatitis, pancreatitis, or biliary disease by blood work.  Suspect the patient has chest wall pain or costochondritis.  He is given information regarding this.  He is advised he needs to quit smoking.  At this point he does not want to try a meds or patches.  Reasons to return to the emergency room discussed.  I have reviewed the nursing notes.    I have reviewed the findings, diagnosis, plan and need for follow up with the patient.       New Prescriptions    No medications on file       Final diagnoses:   Chest wall pain       5/10/2018   [facility] EMERGENCY DEPARTMENT     Roni Alex MD  05/10/18 1899

## 2018-05-10 NOTE — DISCHARGE INSTRUCTIONS
Chest Wall Pain: Costochondritis    The chest pain that you have had today is caused by costochondritis. This condition is caused by an inflammation of the cartilage joining your ribs to your breastbone. It is not caused by heart or lung problems. Your healthcare team has made sure that the chest pain you feel is not from a life threatening cause of chest pain such as heart attack, collapsed lung, blood clot in the lung, tear in the aorta, or esophageal rupture. The inflammation may have been brought on by a blow to the chest, lifting heavy objects, intense exercise, or an illness that made you cough and sneeze a lot. It often occurs during times of emotional stress. It can be painful, but it is not dangerous. It usually goes away in 1 to 2 weeks. But it may happen again. Rarely, a more serious condition may cause symptoms similar to costochondritis. That s why it s important to watch for the warning signs listed below.  Home care  Follow these guidelines when caring for yourself at home:    If you feel that emotional stress is a cause of your condition, try to figure out the sources of that stress. It may not be obvious. Learn ways to deal with the stress in your life. This can include regular exercise, muscle relaxation, meditation, or simply taking time out for yourself.    You may use acetaminophen, ibuprofen, or naproxen to control pain, unless another pain medicine was prescribed. If you have liver or kidney disease or ever had a stomach ulcer, talk with your healthcare provider before using these medicines.    You can also help ease pain by using a hot, wet compress or heating pad. Use this with or without a medicated skin cream that helps relieves pain.    Do stretching exercise as advised by your provider.    Take any prescribed medicines as directed.  Follow-up care  Follow up with your healthcare provider, or as advised, if you do not start to get better in the next 2 days.  When to seek medical  advice  Call your healthcare provider right away if any of these occur:    A change in the type of pain. Call if it feels different, becomes more serious, lasts longer, or spreads into your shoulder, arm, neck, jaw, or back.    Shortness of breath or pain gets worse when you breathe    Weakness, dizziness, or fainting    Cough with dark-colored sputum (phlegm) or blood    Abdominal pain    Dark red or black stools    Fever of 100.4 F (38 C) or higher, or as directed by your healthcare provider  Date Last Reviewed: 12/1/2016 2000-2017 [employer]. [address] [place] [address]. All rights reserved. This information is not intended as a substitute for professional medical care. Always follow your healthcare professional's instructions.

## 2018-05-10 NOTE — ED AVS SNAPSHOT
[facility] Emergency Department    [address] [facility] [address]    Phone:  [phone]    Fax:  [phone]                                       Tree Conti   MRN: [id number]    Department:  [facility] Emergency Department   Date of Visit:  5/10/2018           After Visit Summary Signature Page     I have received my discharge instructions, and my questions have been answered. I have discussed any challenges I see with this plan with the nurse or doctor.    ..........................................................................................................................................  Patient/Patient Representative Signature      ..........................................................................................................................................  Patient Representative Print Name and Relationship to Patient    ..................................................               ................................................  Date                                            Time    ..........................................................................................................................................  Reviewed by Signature/Title    ...................................................              ..............................................  Date                                                            Time

## 2018-07-10 ENCOUNTER — TELEPHONE (OUTPATIENT)
Dept: FAMILY MEDICINE | Facility: CLINIC | Age: 23
End: 2018-07-10

## 2018-07-10 DIAGNOSIS — E10.9 TYPE 1 DIABETES MELLITUS WITHOUT COMPLICATION (H): ICD-10-CM

## 2018-07-10 NOTE — TELEPHONE ENCOUNTER
Requested Prescriptions   Pending Prescriptions Disp Refills     insulin aspart (NOVOLOG VIAL) 100 UNITS/ML injection 36 mL 3     Sig: USE 1 UNIT EVERY 10 GRAMS CARB. 1 UNIT FOR EVERY 50 POINTS OVER 150. UP TO 50 UNITS DAILY (Needs follow-up appointment for this medication)    There is no refill protocol information for this order        Last Written Prescription Date:  12/8/17  Last Fill Quantity: 36 mL,  # refills: 3   Last office visit: 12/8/2017 with prescribing provider:  Tosteson   Future Office Visit:

## 2018-07-11 NOTE — TELEPHONE ENCOUNTER
"Requested Prescriptions   Pending Prescriptions Disp Refills     insulin aspart (NOVOLOG VIAL) 100 UNITS/ML injection 36 mL 3     Sig: USE 1 UNIT EVERY 10 GRAMS CARB. 1 UNIT FOR EVERY 50 POINTS OVER 150. UP TO 50 UNITS DAILY (Needs follow-up appointment for this medication)    Short Acting Insulin Protocol Failed    7/10/2018  7:25 AM       Failed - Blood pressure less than 140/90 in past 6 months    BP Readings from Last 3 Encounters:   05/10/18 149/88   12/08/17 141/86   10/28/16 138/84          Failed - HgbA1C in past 3 or 6 months    If HgbA1C is 8 or greater, it needs to be on file within the past 3 months.  If less than 8, must be on file within the past 6 months.     Recent Labs   Lab Test  12/08/17   1455   A1C  7.2*          Failed - Recent (6 mo) or future (30 days) visit within the authorizing provider's specialty    Patient had office visit in the last 6 months or has a visit in the next 30 days with authorizing provider or within the authorizing provider's specialty.  See \"Patient Info\" tab in inbasket, or \"Choose Columns\" in Meds & Orders section of the refill encounter.           Passed - LDL on file in past 12 months    Recent Labs   Lab Test  12/08/17   1455   LDL  105*          Passed - Microalbumin on file in past 12 months    Recent Labs   Lab Test  12/08/17   1456   MICROL  8   UMALCR  10.48          Passed - Serum creatinine on file in past 12 months    Recent Labs   Lab Test  05/10/18   1550   CR  0.84          Passed - Patient is age 18 or older        Routing refill request to provider for review/approval because:  Labs out of range:  LDL  Labs not current:  A1C  BP not at goal.    Left message for patient to return call to clinic. Needs OV and Labs.   Anita HARDING RN          "

## 2018-07-11 NOTE — TELEPHONE ENCOUNTER
Covering for PCP:  One refill signed.  Agree with need for follow-up labs and appointment.     Alan Cruz MD

## 2018-07-12 NOTE — TELEPHONE ENCOUNTER
CSS okay to deliver message below.     Left message for patient to call back at [phone].    Janina YOON RN

## 2018-07-20 ENCOUNTER — TELEPHONE (OUTPATIENT)
Dept: FAMILY MEDICINE | Facility: CLINIC | Age: 23
End: 2018-07-20

## 2018-07-20 NOTE — TELEPHONE ENCOUNTER
"Reason for Call:  Other prescription and lab appt    Detailed comments: pt calling wondering if he can do the labs and get his medication refilled. He was supposed to be seen today but had an \"emergency come up\" and had to cancel. He would like us to call him and leave a message if he doesn't answer.    Phone Number Patient can be reached at: Home number on file [phone] (home)    Best Time: any    Can we leave a detailed message on this number? YES    Call taken on 7/20/2018 at 12:03 PM by Tara Kay      "

## 2018-07-27 ENCOUNTER — OFFICE VISIT (OUTPATIENT)
Dept: FAMILY MEDICINE | Facility: CLINIC | Age: 23
End: 2018-07-27
Payer: COMMERCIAL

## 2018-07-27 VITALS
HEIGHT: 68 IN | RESPIRATION RATE: 16 BRPM | BODY MASS INDEX: 35.61 KG/M2 | TEMPERATURE: 98.5 F | DIASTOLIC BLOOD PRESSURE: 94 MMHG | HEART RATE: 78 BPM | WEIGHT: 235 LBS | SYSTOLIC BLOOD PRESSURE: 130 MMHG

## 2018-07-27 DIAGNOSIS — E10.9 TYPE 1 DIABETES MELLITUS WITHOUT COMPLICATION (H): ICD-10-CM

## 2018-07-27 DIAGNOSIS — E78.5 HYPERLIPIDEMIA LDL GOAL <100: ICD-10-CM

## 2018-07-27 DIAGNOSIS — I10 ESSENTIAL HYPERTENSION: ICD-10-CM

## 2018-07-27 DIAGNOSIS — Z13.6 CARDIOVASCULAR SCREENING; LDL GOAL LESS THAN 100: ICD-10-CM

## 2018-07-27 LAB
CHOLEST SERPL-MCNC: 186 MG/DL
HBA1C MFR BLD: 7 % (ref 0–5.6)
HDLC SERPL-MCNC: 36 MG/DL
LDLC SERPL CALC-MCNC: 131 MG/DL
NONHDLC SERPL-MCNC: 150 MG/DL
TRIGL SERPL-MCNC: 96 MG/DL

## 2018-07-27 PROCEDURE — 36415 COLL VENOUS BLD VENIPUNCTURE: CPT | Performed by: FAMILY MEDICINE

## 2018-07-27 PROCEDURE — 83036 HEMOGLOBIN GLYCOSYLATED A1C: CPT | Performed by: FAMILY MEDICINE

## 2018-07-27 PROCEDURE — 99214 OFFICE O/P EST MOD 30 MIN: CPT | Performed by: FAMILY MEDICINE

## 2018-07-27 PROCEDURE — 80061 LIPID PANEL: CPT | Performed by: FAMILY MEDICINE

## 2018-07-27 RX ORDER — LISINOPRIL 10 MG/1
10 TABLET ORAL DAILY
Qty: 30 TABLET | Refills: 11 | Status: SHIPPED | OUTPATIENT
Start: 2018-07-27 | End: 2019-02-01

## 2018-07-27 RX ORDER — SYRINGE-NEEDLE,INSULIN,0.5 ML 27GX1/2"
SYRINGE, EMPTY DISPOSABLE MISCELLANEOUS
Qty: 540 EACH | Refills: 3 | Status: SHIPPED | OUTPATIENT
Start: 2018-07-27 | End: 2019-10-13

## 2018-07-27 NOTE — PATIENT INSTRUCTIONS
"(E10.65) Uncontrolled type 1 diabetes mellitus without complication (H)  (primary encounter diagnosis)  Comment:   Plan: insulin detemir (LEVEMIR VIAL) 100 UNIT/ML         injection, blood glucose monitoring (ONETOUCH         VERIO IQ) test strip, insulin syringe-needle         U-100 (B-D INSULIN SYRINGE) 31G X 5/16\" 0.5 ML,        Lipid panel reflex to direct LDL Fasting,         Hemoglobin A1c, **A1C FUTURE anytime, Albumin         Random Urine Quantitative with Creat Ratio,         **CBC with platelets FUTURE anytime,         **Creatinine FUTURE anytime, **TSH with free T4        reflex FUTURE anytime        Do the lipids and A1c today. The last LDL was 105 and the goal is under 100. The A1c goal is under 7.0%.   Use the non drug therapies. Exercise daily and use the weight control and see the eye doctor. Do the daily foot care.   Do the follow up lab in six months before the appt. Consider contacting our new Diabetes [facility].     (I10) Essential hypertension  Comment:   Plan: lisinopril (PRINIVIL/ZESTRIL) 10 MG tablet        Consider starting the Lisinopril at 10 mg daily. Monitor and record the BP at rest and use the non drug therapies.   Call if any side effects. If doing well then recheck as above.   "

## 2018-07-27 NOTE — PROGRESS NOTES
"  SUBJECTIVE:   CC: Tree Conti is an 22 year old male who presents for preventative health visit.     Healthy Habits:    Do you get at least three servings of calcium containing foods daily (dairy, green leafy vegetables, etc.)? {YES/NO, DAIRY INTAKE:507064::\"yes\"}    Amount of exercise or daily activities, outside of work: {AMOUNT EXERCISE:545204}    Problems taking medications regularly {Yes /No default:602422::\"No\"}    Medication side effects: {Yes /No default.:432824::\"No\"}    Have you had an eye exam in the past two years? {YESNOBLANK:221956}    Do you see a dentist twice per year? {YESNOBLANK:713059}    Do you have sleep apnea, excessive snoring or daytime drowsiness?{YESNOBLANK:090529}  {Outside tests to abstract? :839746}     {additional problems to add (Optional):465162}    Today's PHQ-2 Score:   PHQ-2 ( 1999 [employer]) 5/20/2016 9/21/2015   Q1: Little interest or pleasure in doing things 0 0   Q2: Feeling down, depressed or hopeless 0 0   PHQ-2 Score 0 0     {PHQ-2 LOOK IN ASSESSMENTS :256406}  Abuse: Current or Past(Physical, Sexual or Emotional)- {YES/NO/NA:064859}  Do you feel safe in your environment - {YES/NO/NA:405904}    Social History   Substance Use Topics     Smoking status: Current Every Day Smoker     Packs/day: 0.25     Types: Cigarettes     Smokeless tobacco: Former User      Comment: 1/4 to 1/2 ppd.     Alcohol use 0.0 oz/week     0 Standard drinks or equivalent per week      Comment: 4-5 times per week, 5-6 drinks per time.      If you drink alcohol do you typically have >3 drinks per day or >7 drinks per week? {ETOH :422338}                      Last PSA: No results found for: PSA    Reviewed orders with patient. Reviewed health maintenance and updated orders accordingly - {Yes/No:712344::\"Yes\"}  {Chronicprobdata (Optional):657387}    Reviewed and updated as needed this visit by clinical staff         Reviewed and updated as needed this visit by Provider        {HISTORY OPTIONS " "(Optional):379932}    ROS:  { :925555::\"CONSTITUTIONAL: NEGATIVE for fever, chills, change in weight\",\"INTEGUMENTARY/SKIN: NEGATIVE for worrisome rashes, moles or lesions\",\"EYES: NEGATIVE for vision changes or irritation\",\"ENT: NEGATIVE for ear, mouth and throat problems\",\"RESP: NEGATIVE for significant cough or SOB\",\"CV: NEGATIVE for chest pain, palpitations or peripheral edema\",\"GI: NEGATIVE for nausea, abdominal pain, heartburn, or change in bowel habits\",\" male: negative for dysuria, hematuria, decreased urinary stream, erectile dysfunction, urethral discharge\",\"MUSCULOSKELETAL: NEGATIVE for significant arthralgias or myalgia\",\"NEURO: NEGATIVE for weakness, dizziness or paresthesias\",\"PSYCHIATRIC: NEGATIVE for changes in mood or affect\"}    OBJECTIVE:   There were no vitals taken for this visit.  EXAM:  {Exam Choices:953910}    {Diagnostic Test Results (Optional):419553::\"Diagnostic Test Results:\",\"none \"}    ASSESSMENT/PLAN:   {Diag Picklist:039409}    COUNSELING:  {MALE COUNSELING MESSAGES:284649::\"Reviewed preventive health counseling, as reflected in patient instructions\"}    BP Readings from Last 1 Encounters:   05/10/18 149/88     Estimated body mass index is 38.01 kg/(m^2) as calculated from the following:    Height as of 5/10/18: 5' 8\" (1.727 m).    Weight as of 5/10/18: 250 lb (113.4 kg).    {BP Counseling- Complete if BP >= 120/80  (Optional):346572}  {Weight Management Plan (ACO) Complete if BMI is abnormal-  Ages 18-64  BMI >24.9.  Age 65+ with BMI <23 or >30 (Optional):983683}     reports that he has been smoking Cigarettes.  He has been smoking about 0.25 packs per day. He has quit using smokeless tobacco.  {Tobacco Cessation -- Complete if patient is a smoker (Optional):531527}    Counseling Resources:  ATP IV Guidelines  Pooled Cohorts Equation Calculator  FRAX Risk Assessment  ICSI Preventive Guidelines  Dietary Guidelines for Americans, 2010  USDA's MyPlate  ASA Prophylaxis  Lung CA " Screening    Alfredo Chiu MD  [facility]

## 2018-07-27 NOTE — MR AVS SNAPSHOT
"              After Visit Summary   7/27/2018    Tree Conti    MRN: [id number]           Patient Information     Date Of Birth          1995        Visit Information        Provider Department      7/27/2018 2:40 PM Alfredo Chiu MD [facility]        Today's Diagnoses     Uncontrolled type 1 diabetes mellitus without complication (H)    -  1    Essential hypertension        Type 1 diabetes mellitus without complication (H)        CARDIOVASCULAR SCREENING; LDL GOAL LESS THAN 100          Care Instructions    (E10.65) Uncontrolled type 1 diabetes mellitus without complication (H)  (primary encounter diagnosis)  Comment:   Plan: insulin detemir (LEVEMIR VIAL) 100 UNIT/ML         injection, blood glucose monitoring (ONETOUCH         VERIO IQ) test strip, insulin syringe-needle         U-100 (B-D INSULIN SYRINGE) 31G X 5/16\" 0.5 ML,        Lipid panel reflex to direct LDL Fasting,         Hemoglobin A1c, **A1C FUTURE anytime, Albumin         Random Urine Quantitative with Creat Ratio,         **CBC with platelets FUTURE anytime,         **Creatinine FUTURE anytime, **TSH with free T4        reflex FUTURE anytime        Do the lipids and A1c today. The last LDL was 105 and the goal is under 100. The A1c goal is under 7.0%.   Use the non drug therapies. Exercise daily and use the weight control and see the eye doctor. Do the daily foot care.   Do the follow up lab in six months before the appt. Consider contacting our new Diabetes RNEly.     (I10) Essential hypertension  Comment:   Plan: lisinopril (PRINIVIL/ZESTRIL) 10 MG tablet        Consider starting the Lisinopril at 10 mg daily. Monitor and record the BP at rest and use the non drug therapies.   Call if any side effects. If doing well then recheck as above.           Follow-ups after your visit        Future tests that were ordered for you today     Open Future Orders        Priority Expected Expires Ordered    **A1C FUTURE anytime " "Routine 10/27/2018 2019 2018    Albumin Random Urine Quantitative with Creat Ratio Routine 2018    **CBC with platelets FUTURE anytime Routine 2018    **Creatinine FUTURE anytime Routine 2018    **TSH with free T4 reflex FUTURE anytime Routine 2018            Who to contact     If you have questions or need follow up information about today's clinic visit or your schedule please contact [facility] directly at [phone].  Normal or non-critical lab and imaging results will be communicated to you by [employer], letter or phone within 4 business days after the clinic has received the results. If you do not hear from us within 7 days, please contact the clinic through [employer] or phone. If you have a critical or abnormal lab result, we will notify you by phone as soon as possible.  Submit refill requests through [employer] or call your pharmacy and they will forward the refill request to us. Please allow 3 business days for your refill to be completed.          Additional Information About Your Visit        [employer]rick[employer] Information     [employer] lets you send messages to your doctor, view your test results, renew your prescriptions, schedule appointments and more. To sign up, go to [email or web address] . Click on \"Log in\" on the left side of the screen, which will take you to the Welcome page. Then click on \"Sign up Now\" on the right side of the page.     You will be asked to enter the access code listed below, as well as some personal information. Please follow the directions to create your username and password.     Your access code is: [email or web address]  Expires: 2018  5:36 PM     Your access code will  in 90 days. If you need help or a new code, please call your [place] clinic or [phone].        Care EveryWhere ID     This is your Care EveryWhere ID. This could be used by other organizations " "to access your [place] medical records  [id number]        Your Vitals Were     Pulse Temperature Respirations Height BMI (Body Mass Index)       78 98.5  F (36.9  C) (Tympanic) 16 5' 8\" (1.727 m) 35.73 kg/m2        Blood Pressure from Last 3 Encounters:   05/10/18 149/88   12/08/17 141/86   10/28/16 138/84    Weight from Last 3 Encounters:   07/27/18 235 lb (106.6 kg)   05/10/18 250 lb (113.4 kg)   12/08/17 249 lb (112.9 kg)              We Performed the Following     Hemoglobin A1c     Lipid panel reflex to direct LDL Fasting          Today's Medication Changes          These changes are accurate as of 7/27/18  3:38 PM.  If you have any questions, ask your nurse or doctor.               Start taking these medicines.        Dose/Directions    lisinopril 10 MG tablet   Commonly known as:  PRINIVIL/ZESTRIL   Used for:  Essential hypertension   Started by:  Alfredo Chiu MD        Dose:  10 mg   Take 1 tablet (10 mg) by mouth daily   Quantity:  30 tablet   Refills:  11         These medicines have changed or have updated prescriptions.        Dose/Directions    insulin aspart 100 UNITS/ML injection   Commonly known as:  NovoLOG VIAL   This may have changed:  additional instructions   Used for:  Type 1 diabetes mellitus without complication (H)   Changed by:  Alfredo Chiu MD        USE 1 UNIT EVERY 10 GRAMS CARB. 1 UNIT FOR EVERY 50 POINTS OVER 150. UP TO 50 UNITS DAILY   Quantity:  36 mL   Refills:  3       insulin detemir 100 UNIT/ML injection   Commonly known as:  LEVEMIR VIAL   This may have changed:  additional instructions   Used for:  Uncontrolled type 1 diabetes mellitus without complication (H), CARDIOVASCULAR SCREENING; LDL GOAL LESS THAN 100   Changed by:  Alfredo Chiu MD        INJECT 42 UNITS TWICE DAILY UNDER THE SKIN   Quantity:  60 mL   Refills:  3            Where to get your medicines      These medications were sent to [facility] [id number] [place], [place] - [address]   [address] [place] [address]     Phone:  [phone]     blood glucose monitoring test strip    insulin aspart 100 UNITS/ML injection    insulin detemir 100 UNIT/ML injection    insulin syringe-needle U-100 31G X 5/16\" 0.5 ML    lisinopril 10 MG tablet                Primary Care Provider Office Phone # Fax #    Alfredo Chiu [phone] [address] [facility] [address]        Equal Access to Services     TONNY KO : Hadii aad ku hadasho Soomaali, waaxda luqadaha, qaybta kaalmada adeegyada, waxay idiin hayaan adeeg kharash lacarlos . So [facility] [phone].    ATENCIÓN: Si habla español, tiene a chapa disposición servicios gratuitos de asistencia lingüística. Llame al [phone].    We comply with applicable federal civil rights laws and [place] laws. We do not discriminate on the basis of race, color, national origin, age, disability, sex, sexual orientation, or gender identity.            Thank you!     Thank you for choosing [facility]  for your care. Our goal is always to provide you with excellent care. Hearing back from our patients is one way we can continue to improve our services. Please take a few minutes to complete the written survey that you may receive in the mail after your visit with us. Thank you!             Your Updated Medication List - Protect others around you: Learn how to safely use, store and throw away your medicines at www.disposemymeds.org.          This list is accurate as of 7/27/18  3:38 PM.  Always use your most recent med list.                   Brand Name Dispense Instructions for use Diagnosis    ASPIRIN NOT PRESCRIBED    INTENTIONAL    0 each    1 each Antiplatelet medication not prescribed intentionally due to Not indicated based on age    Type 1 diabetes, HbA1c goal < 8% (H), Need for prophylactic vaccination and inoculation against influenza, Tobacco abuse, Elevated blood pressure reading without diagnosis " "of hypertension       BENADRYL PO      Take 25 mg by mouth daily as needed        blood glucose monitoring lancets     100 each    Use to test blood sugar 4 times daily or as directed.  Ok to substitute alternative if insurance prefers.    Uncontrolled type 1 diabetes mellitus without complication (H)       blood glucose monitoring meter device kit     1 kit    Use to test blood sugars 4 daily or as directed.  Ok to substitute alternative if insurance prefers.    Uncontrolled type 1 diabetes mellitus without complication (H)       blood glucose monitoring test strip    ONETOUCH VERIO IQ    600 strip    USE TO TEST BLOOD SUGAR 6 TIMES DAILY OR AS DIRECTED. (VERIO)    Uncontrolled type 1 diabetes mellitus without complication (H)       insulin aspart 100 UNITS/ML injection    NovoLOG VIAL    36 mL    USE 1 UNIT EVERY 10 GRAMS CARB. 1 UNIT FOR EVERY 50 POINTS OVER 150. UP TO 50 UNITS DAILY    Type 1 diabetes mellitus without complication (H)       insulin detemir 100 UNIT/ML injection    LEVEMIR VIAL    60 mL    INJECT 42 UNITS TWICE DAILY UNDER THE SKIN    Uncontrolled type 1 diabetes mellitus without complication (H), CARDIOVASCULAR SCREENING; LDL GOAL LESS THAN 100       insulin syringe-needle U-100 31G X 5/16\" 0.5 ML    B-D INSULIN SYRINGE    540 each    USE ONE SYRINGE 6 X DAILY OR AS DIRECTED.    Uncontrolled type 1 diabetes mellitus without complication (H)       lisinopril 10 MG tablet    PRINIVIL/ZESTRIL    30 tablet    Take 1 tablet (10 mg) by mouth daily    Essential hypertension       STATIN NOT PRESCRIBED (INTENTIONAL)     0 each    Statin not prescribed intentionally due to Not indicated based on age    Type 1 diabetes, HbA1c goal < 8% (H), Need for prophylactic vaccination and inoculation against influenza, Tobacco abuse, Elevated blood pressure reading without diagnosis of hypertension         "

## 2018-07-27 NOTE — PROGRESS NOTES
SUBJECTIVE:   Tree Conti is a 22 year old male who presents to clinic today for the following health issues:      Diabetes Follow-up    Patient is checking blood sugars: 6-8 times daily.    Blood sugar testing frequency justification: Uncontrolled diabetes  Results are as follows:         On average 100-200    Diabetic concerns: None     Symptoms of hypoglycemia (low blood sugar): Symptoms occur if sugars < 60.     Paresthesias (numbness or burning in feet) or sores: No     Date of last diabetic eye exam: within the last year    BP Readings from Last 2 Encounters:   07/27/18 (!) 130/94   05/10/18 149/88     Hemoglobin A1C (%)   Date Value   07/27/2018 7.0 (H)   12/08/2017 7.2 (H)     LDL Cholesterol Calculated (mg/dL)   Date Value   12/08/2017 105 (H)   10/28/2016 131 (H)       Diabetes Management Resources    Amount of exercise or physical activity: 6-7 days/week for an average of greater than 60 minutes    Problems taking medications regularly: No    Medication side effects: none    Diet: gluten-free/reduced      Current Outpatient Prescriptions:      blood glucose monitoring (ONE TOUCH DELICA) lancets, Use to test blood sugar 4 times daily or as directed.  Ok to substitute alternative if insurance prefers., Disp: 100 each, Rfl: 11     blood glucose monitoring (ONETOUCH VERIO IQ) test strip, USE TO TEST BLOOD SUGAR 6 TIMES DAILY OR AS DIRECTED. (VERIO), Disp: 600 strip, Rfl: 3     blood glucose monitoring (ONETOUCH VERIO SYNC SYSTEM) meter device kit, Use to test blood sugars 4 daily or as directed.  Ok to substitute alternative if insurance prefers., Disp: 1 kit, Rfl: 1     insulin aspart (NOVOLOG VIAL) 100 UNITS/ML injection, USE 1 UNIT EVERY 10 GRAMS CARB. 1 UNIT FOR EVERY 50 POINTS OVER 150. UP TO 50 UNITS DAILY, Disp: 36 mL, Rfl: 3     insulin detemir (LEVEMIR VIAL) 100 UNIT/ML injection, INJECT 42 UNITS TWICE DAILY UNDER THE SKIN, Disp: 60 mL, Rfl: 3     insulin syringe-needle U-100 (B-D INSULIN  "SYRINGE) 31G X 5/16\" 0.5 ML, USE ONE SYRINGE 6 X DAILY OR AS DIRECTED., Disp: 540 each, Rfl: 3     lisinopril (PRINIVIL/ZESTRIL) 10 MG tablet, Take 1 tablet (10 mg) by mouth daily, Disp: 30 tablet, Rfl: 11     STATIN NOT PRESCRIBED, INTENTIONAL,, Statin not prescribed intentionally due to Not indicated based on age, Disp: 0 each, Rfl: 0     ASPIRIN NOT PRESCRIBED, INTENTIONAL,, 1 each Antiplatelet medication not prescribed intentionally due to Not indicated based on age, Disp: 0 each, Rfl: 0     DiphenhydrAMINE HCl (BENADRYL PO), Take 25 mg by mouth daily as needed, Disp: , Rfl:      [DISCONTINUED] insulin aspart (NOVOLOG VIAL) 100 UNITS/ML injection, USE 1 UNIT EVERY 10 GRAMS CARB. 1 UNIT FOR EVERY 50 POINTS OVER 150. UP TO 50 UNITS DAILY (Needs follow-up appointment for this medication), Disp: 36 mL, Rfl: 0     [DISCONTINUED] insulin detemir (LEVEMIR VIAL) 100 UNIT/ML injection, INJECT 41 UNITS TWICE DAILY UNDER THE SKIN (Patient taking differently: INJECT 42 UNITS TWICE DAILY UNDER THE SKIN), Disp: 60 mL, Rfl: 3     [DISCONTINUED] ORDER FOR DME, Test strips for pt's glucometer, brand as covered by insurance. Test 6-8 times daily and prn., Disp: 750 each, Rfl: 4    Patient Active Problem List   Diagnosis     Celiac disease     CARDIOVASCULAR SCREENING; LDL GOAL LESS THAN 100     Tobacco abuse     SVT (supraventricular tachycardia) (H)     Uncontrolled type 1 diabetes mellitus without complication (H)     Essential hypertension     Blood pressure (!) 130/94, pulse 78, temperature 98.5  F (36.9  C), temperature source Tympanic, resp. rate 16, height 5' 8\" (1.727 m), weight 235 lb (106.6 kg).    Exam:  GENERAL APPEARANCE: healthy, alert and no distress  EYES: EOMI,  PERRL  NECK: no adenopathy, no asymmetry, masses, or scars and thyroid normal to palpation  RESP: lungs clear to auscultation - no rales, rhonchi or wheezes  CV: regular rates and rhythm, normal S1 S2, no S3 or S4 and no murmur, click or rub -  SKIN: no " "suspicious lesions or rashes  PSYCH: mentation appears normal and affect normal/bright      (E10.65) Uncontrolled type 1 diabetes mellitus without complication (H)  (primary encounter diagnosis)  Comment:   Plan: insulin detemir (LEVEMIR VIAL) 100 UNIT/ML         injection, blood glucose monitoring (ONETOUCH         VERIO IQ) test strip, insulin syringe-needle         U-100 (B-D INSULIN SYRINGE) 31G X 5/16\" 0.5 ML,        Lipid panel reflex to direct LDL Fasting,         Hemoglobin A1c, **A1C FUTURE anytime, Albumin         Random Urine Quantitative with Creat Ratio,         **CBC with platelets FUTURE anytime,         **Creatinine FUTURE anytime, **TSH with free T4        reflex FUTURE anytime        Do the lipids and A1c today. The last LDL was 105 and the goal is under 100. The A1c goal is under 7.0%.   Use the non drug therapies. Exercise daily and use the weight control and see the eye doctor. Do the daily foot care.   Do the follow up lab in six months before the appt. Consider contacting our new Diabetes RNEly.     (I10) Essential hypertension  Comment:   Plan: lisinopril (PRINIVIL/ZESTRIL) 10 MG tablet        Consider starting the Lisinopril at 10 mg daily. Monitor and record the BP at rest and use the non drug therapies.   Call if any side effects. If doing well then recheck as above.       Alfredo Chiu        "

## 2018-07-30 PROBLEM — E78.5 HYPERLIPIDEMIA LDL GOAL <100: Status: ACTIVE | Noted: 2018-07-30

## 2018-07-30 RX ORDER — ATORVASTATIN CALCIUM 10 MG/1
10 TABLET, FILM COATED ORAL DAILY
Qty: 30 TABLET | Refills: 11 | Status: SHIPPED | OUTPATIENT
Start: 2018-07-30 | End: 2019-02-01

## 2018-07-31 ENCOUNTER — TELEPHONE (OUTPATIENT)
Dept: FAMILY MEDICINE | Facility: CLINIC | Age: 23
End: 2018-07-31

## 2018-07-31 DIAGNOSIS — Z13.6 CARDIOVASCULAR SCREENING; LDL GOAL LESS THAN 100: ICD-10-CM

## 2018-07-31 DIAGNOSIS — E10.9 TYPE 1 DIABETES MELLITUS WITHOUT COMPLICATION (H): ICD-10-CM

## 2018-07-31 NOTE — TELEPHONE ENCOUNTER
NOTE     Alternative requested Novolog 50mls insurance requires a 90 day. Gina alt req 80mls ins wont cover unless a 90 day

## 2018-08-29 DIAGNOSIS — E10.9 TYPE 1 DIABETES MELLITUS WITHOUT COMPLICATION (H): ICD-10-CM

## 2018-08-29 NOTE — TELEPHONE ENCOUNTER
"Requested Prescriptions   Pending Prescriptions Disp Refills     NOVOLOG VIAL 100 UNIT/ML soln [Pharmacy Med Name: NOVOLOG 100 UNIT/ML VIAL]  Last Written Prescription Date:  07/31/2018  Last Fill Quantity: 50 ml,  # refills: 1   Last office visit: 7/27/2018 with prescribing provider:  devan   Future Office Visit:     40 mL 0     Sig: INJECT 1 UNIT PER/10 GRAMS CARBS, 1 UNIT FOR EVERY 50 POINTS OVER 150 UP TO 50 UNITS DAILY AS DIRECT    Short Acting Insulin Protocol Failed    8/29/2018 11:23 AM       Failed - Blood pressure less than 140/90 in past 6 months    BP Readings from Last 3 Encounters:   07/27/18 (!) 130/94   05/10/18 149/88   12/08/17 141/86                Passed - LDL on file in past 12 months    Recent Labs   Lab Test  07/27/18   1540   LDL  131*            Passed - Microalbumin on file in past 12 months    Recent Labs   Lab Test  12/08/17   1456   MICROL  8   UMALCR  10.48            Passed - Serum creatinine on file in past 12 months    Recent Labs   Lab Test  05/10/18   1550   CR  0.84            Passed - HgbA1C in past 3 or 6 months    If HgbA1C is 8 or greater, it needs to be on file within the past 3 months.  If less than 8, must be on file within the past 6 months.     Recent Labs   Lab Test  07/27/18   1540   A1C  7.0*            Passed - Patient is age 18 or older       Passed - Recent (6 mo) or future (30 days) visit within the authorizing provider's specialty    Patient had office visit in the last 6 months or has a visit in the next 30 days with authorizing provider or within the authorizing provider's specialty.  See \"Patient Info\" tab in inbasket, or \"Choose Columns\" in Meds & Orders section of the refill encounter.            Lc Jenkins RT (R)    "

## 2018-08-31 NOTE — TELEPHONE ENCOUNTER
Prescription approved per [facility].  OV notes 7-27-18:  Do the lipids and A1c today. The last LDL was 105 and the goal is under 100. The A1c goal is under 7.0%.   Use the non drug therapies. Exercise daily and use the weight control and see the eye doctor. Do the daily foot care.   Do the follow up lab in six months before the appt. Consider contacting our new Diabetes RNEly.   (I10) Essential hypertension  Comment:   Plan: lisinopril (PRINIVIL/ZESTRIL) 10 MG tablet        Consider starting the Lisinopril at 10 mg daily. Monitor and record the BP at rest and use the non drug therapies.   Call if any side effects. If doing well then recheck as above.        Ninoska RODRÍGUEZ RN

## 2018-12-01 ENCOUNTER — HOSPITAL ENCOUNTER (EMERGENCY)
Facility: CLINIC | Age: 23
Discharge: HOME | End: 2018-12-01
Attending: PHYSICIAN ASSISTANT | Admitting: PHYSICIAN ASSISTANT
Payer: COMMERCIAL

## 2018-12-01 VITALS
HEIGHT: 68 IN | BODY MASS INDEX: 35.73 KG/M2 | OXYGEN SATURATION: 99 % | TEMPERATURE: 98 F | HEART RATE: 100 BPM | SYSTOLIC BLOOD PRESSURE: 132 MMHG | RESPIRATION RATE: 18 BRPM | DIASTOLIC BLOOD PRESSURE: 91 MMHG

## 2018-12-01 DIAGNOSIS — R09.A9 FOREIGN BODY SENSATION IN RIGHT EAR CANAL: ICD-10-CM

## 2018-12-01 PROCEDURE — 99213 OFFICE O/P EST LOW 20 MIN: CPT | Performed by: PHYSICIAN ASSISTANT

## 2018-12-01 PROCEDURE — G0463 HOSPITAL OUTPT CLINIC VISIT: HCPCS

## 2018-12-01 NOTE — ED AVS SNAPSHOT
[facility] Department    [address] [facility] [address]    Phone:  [phone]    Fax:  [phone]                                       Tree Conti   MRN: [id number]    Department:  [facility] Department   Date of Visit:  12/1/2018           After Visit Summary Signature Page     I have received my discharge instructions, and my questions have been answered. I have discussed any challenges I see with this plan with the nurse or doctor.    ..........................................................................................................................................  Patient/Patient Representative Signature      ..........................................................................................................................................  Patient Representative Print Name and Relationship to Patient    ..................................................               ................................................  Date                                   Time    ..........................................................................................................................................  Reviewed by Signature/Title    ...................................................              ..............................................  Date                                               Time          22EPIC Rev 08/18

## 2018-12-01 NOTE — ED AVS SNAPSHOT
[facility] Emergency Department    [address] [address]    Phone:  [phone]    Fax:  [phone]                                       Tree Conti   MRN: [id number]    Department:  [facility] Emergency Department   Date of Visit:  12/1/2018           Patient Information     Date Of Birth          1995        Your diagnoses for this visit were:     Foreign body sensation in right ear canal        You were seen by Jeanette Pepe PA-C.      Follow-up Information     Please follow up.    Why:  As needed, If symptoms worsen      24 Hour Appointment Hotline       To make an appointment at any [facility], call [phone] ([phone]). If you don't have a family doctor or clinic, we will help you find one. [facility] clinics are conveniently located to serve the needs of you and your family.             Review of your medicines      Our records show that you are taking the medicines listed below. If these are incorrect, please call your family doctor or clinic.        Dose / Directions Last dose taken    aspirin 81 MG tablet   Commonly known as:  ASA   Dose:  81 mg   Quantity:  30 tablet        Take 1 tablet (81 mg) by mouth daily   Refills:  0        atorvastatin 10 MG tablet   Commonly known as:  LIPITOR   Dose:  10 mg   Quantity:  30 tablet        Take 1 tablet (10 mg) by mouth daily   Refills:  11        BENADRYL PO   Dose:  25 mg        Take 25 mg by mouth daily as needed   Refills:  0        blood glucose monitoring lancets   Quantity:  100 each        Use to test blood sugar 4 times daily or as directed.  Ok to substitute alternative if insurance prefers.   Refills:  11        blood glucose monitoring meter device kit   Quantity:  1 kit        Use to test blood sugars 4 daily or as directed.  Ok to substitute alternative if insurance prefers.   Refills:  1        blood glucose monitoring test strip   Commonly known as:  ONETOUCH VERIO IQ   Quantity:  600 strip  "       USE TO TEST BLOOD SUGAR 6 TIMES DAILY OR AS DIRECTED. (VERIO)   Refills:  3        * insulin aspart 100 UNITS/ML vial   Commonly known as:  NovoLOG VIAL   Quantity:  50 mL        USE 1 UNIT EVERY 10 GRAMS CARB. 1 UNIT FOR EVERY 50 POINTS OVER 150. UP TO 50 UNITS DAILY   Refills:  1        * NovoLOG VIAL 100 UNITS/ML vial   Quantity:  40 mL   Generic drug:  insulin aspart        INJECT 1 UNIT PER/10 GRAMS CARBS, 1 UNIT FOR EVERY 50 POINTS OVER 150 UP TO 50 UNITS DAILY AS DIRECT   Refills:  0        insulin detemir 100 UNIT/ML vial   Commonly known as:  LEVEMIR VIAL   Quantity:  80 mL        INJECT 42 UNITS TWICE DAILY UNDER THE SKIN   Refills:  1        insulin syringe-needle U-100 31G X 5/16\" 0.5 ML miscellaneous   Commonly known as:  B-D INSULIN SYRINGE   Quantity:  540 each        USE ONE SYRINGE 6 X DAILY OR AS DIRECTED.   Refills:  3        lisinopril 10 MG tablet   Commonly known as:  PRINIVIL/ZESTRIL   Dose:  10 mg   Quantity:  30 tablet        Take 1 tablet (10 mg) by mouth daily   Refills:  11        STATIN NOT PRESCRIBED   Commonly known as:  INTENTIONAL   Quantity:  0 each        Statin not prescribed intentionally due to Not indicated based on age   Refills:  0        * Notice:  This list has 2 medication(s) that are the same as other medications prescribed for you. Read the directions carefully, and ask your doctor or other care provider to review them with you.            Orders Needing Specimen Collection     None      Pending Results     No orders found from 11/29/2018 to 12/2/2018.            Pending Culture Results     No orders found from 11/29/2018 to 12/2/2018.            Pending Results Instructions     If you had any lab results that were not finalized at the time of your Discharge, you can call the ED Lab Result RN at [phone]. You will be contacted by this team for any positive Lab results or changes in treatment. The nurses are available 7 days a week from 10A to 6:30P.  You can " "leave a message 24 hours per day and they will return your call.        Test Results From Your Hospital Stay               Thank you for choosing [facility]       Thank you for choosing [facility] for your care. Our goal is always to provide you with excellent care. Hearing back from our patients is one way we can continue to improve our services. Please take a few minutes to complete the written survey that you may receive in the mail after you visit with us. Thank you!        [employer] Information     [employer] lets you send messages to your doctor, view your test results, renew your prescriptions, schedule appointments and more. To sign up, go to [email or web address] . Click on \"Log in\" on the left side of the screen, which will take you to the Welcome page. Then click on \"Sign up Now\" on the right side of the page.     You will be asked to enter the access code listed below, as well as some personal information. Please follow the directions to create your username and password.     Your access code is: [email or web address]  Expires: 3/1/2019  6:41 PM     Your access code will  in 90 days. If you need help or a new code, please call your [facility] clinic or [phone].        Care EveryWhere ID     This is your Care EveryWhere ID. This could be used by other organizations to access your [facility] medical records  [id number]        Equal Access to Services     TONNY KO : Hadii carly Early, waaxda luqadaha, qaybta kaalmada adeegsandra, nigel rivera . So [facility] [phone].    ATENCIÓN: Si habla español, tiene a chapa disposición servicios gratuitos de asistencia lingüística. Llame al [phone].    We comply with applicable federal civil rights laws and [place] laws. We do not discriminate on the basis of race, color, national origin, age, disability, sex, sexual orientation, or gender identity.            After Visit Summary       This is your record. Keep this with you and " show to your community pharmacist(s) and doctor(s) at your next visit.

## 2018-12-02 NOTE — ED PROVIDER NOTES
History     Chief Complaint   Patient presents with     Foreign Body in Ear     HPI  Tree Conti is a 23 year old male who presents to the urgent care with concern over suspected foreign body in his right ear.  2 hours prior to arrival patient was cleaning his ear with a Q-tip.  He states that when he removed it from his ear the cotton-tipped was gone.  He became concerned that it could be stuck in there and attempted to remove with tweezers and another Q-tip without success.  He denies any pain of any hearing changes, discharge from the ear or pain.      Problem List:    Patient Active Problem List    Diagnosis Date Noted     Hyperlipidemia LDL goal <100 07/30/2018     Priority: Medium     Essential hypertension 12/08/2017     Priority: Medium     Uncontrolled type 1 diabetes mellitus without complication (H) 10/28/2016     Priority: Medium     SVT (supraventricular tachycardia) (H) 06/10/2016     Priority: Medium     See Cardiac monitor, May, 2016. Referral to Cardiology, and instructions were done.        Tobacco abuse 09/21/2015     Priority: Medium     CARDIOVASCULAR SCREENING; LDL GOAL LESS THAN 100 03/27/2013     Priority: Medium     Celiac disease 09/23/2010     Priority: Medium        Past Medical History:    Past Medical History:   Diagnosis Date     Celiac disease      Diabetes mellitus (H)      Paroxysmal supraventricular tachycardia (H)        Past Surgical History:    Past Surgical History:   Procedure Laterality Date     EP ABLATION / EP STUDIES  08/18/2016    AVRT ablation     HC TOOTH EXTRACTION W/FORCEP      april       Family History:    Family History   Problem Relation Age of Onset     Heart Disease Mother        Social History:  Marital Status:  Single [1]  Social History   Substance Use Topics     Smoking status: Current Every Day Smoker     Packs/day: 0.25     Types: Cigarettes     Smokeless tobacco: Former User      Comment: 1/4 to 1/2 ppd.     Alcohol use No      Comment: 4-5 times per  "week, 5-6 drinks per time.- HAS NOT DRANK IN 3 months        Medications:      aspirin 81 MG tablet   atorvastatin (LIPITOR) 10 MG tablet   blood glucose monitoring (ONE TOUCH DELICA) lancets   blood glucose monitoring (ONETOUCH VERIO IQ) test strip   blood glucose monitoring (ONETOUCH VERIO SYNC SYSTEM) meter device kit   DiphenhydrAMINE HCl (BENADRYL PO)   insulin aspart (NOVOLOG VIAL) 100 UNITS/ML injection   insulin detemir (LEVEMIR VIAL) 100 UNIT/ML injection   insulin syringe-needle U-100 (B-D INSULIN SYRINGE) 31G X 5/16\" 0.5 ML   lisinopril (PRINIVIL/ZESTRIL) 10 MG tablet   NOVOLOG VIAL 100 UNIT/ML soln   STATIN NOT PRESCRIBED, INTENTIONAL,   [DISCONTINUED] ORDER FOR DME       Review of Systems  CONSTITUTIONAL:NEGATIVE for fever, chills, change in weight  INTEGUMENTARY/SKIN: NEGATIVE for worrisome rashes, moles or lesions  EYES: NEGATIVE for vision changes or irritation  ENT/MOUTH: POSITIVE for suspected foreign body in right ear and NEGATIVE for nasal congestion, ear pain   RESP:NEGATIVE for significant cough or SOB  Physical Exam   BP: (!) 132/91  Pulse: 100  Temp: 98  F (36.7  C)  Resp: 18  Height: 172.7 cm (5' 8\")  SpO2: 99 %  Physical Exam    The right TM is normal: no effusions, no erythema, and normal landmarks     The right auditory canal is non-tender, nonswollen.  No foreign body present.  There is pinpoint area of erythema on the anterior wall of the canal  The left TM is normal: no effusions, no erythema, and normal landmarks  The left auditory canal is normal and without drainage, edema or erythema  Oropharynx exam is normal: no lesions, erythema, adenopathy or exudate.  GENERAL: no acute distress  EYES: EOMI,  PERRL, conjunctiva clear  NECK: supple, non-tender to palpation, no adenopathy noted  RESP: lungs clear to auscultation - no rales, rhonchi or wheezes  CV: regular rates and rhythm, normal S1 S2, no murmur noted  SKIN: no suspicious lesions or rashes   ED Course     ED Course "     Procedures        Critical Care time:  none        No results found for this or any previous visit (from the past 24 hour(s)).    Medications - No data to display    Assessments & Plan (with Medical Decision Making)     I have reviewed the nursing notes.    I have reviewed the findings, diagnosis, plan and need for follow up with the patient.       New Prescriptions    No medications on file     Final diagnoses:   Foreign body sensation in right ear canal     23-year-old male presents to urgent care with concern over suspected foreign body in his right ear canal after he noted that cotton was gone from Q-tip that he used.  He had stable vital signs upon arrival.  Physical exam findings as described above did not demonstrate any evidence of ear canal foreign body, otitis media, otitis externa.  There was a small punctate area of erythema on the anterior wall of canal and would consider superficial trauma from patient's home removal attempts.  Follow up as needed if new or worsening symptoms develop.      Disclaimer: This note consists of symbols derived from keyboarding, dictation, and/or voice recognition software. As a result, there may be errors in the script that have gone undetected.  Please consider this when interpreting information found in the chart.      12/1/2018   [facility] EMERGENCY DEPARTMENT     Jeanette Pepe PA-C  12/01/18 [id number]

## 2018-12-31 DIAGNOSIS — E11.9 TYPE 2 DIABETES MELLITUS WITHOUT COMPLICATION, WITHOUT LONG-TERM CURRENT USE OF INSULIN (H): Primary | ICD-10-CM

## 2018-12-31 NOTE — TELEPHONE ENCOUNTER
Patient has new meter, could he get orders for Accu-Chek Avia Plus lancets and test strips with directions?

## 2019-01-02 ENCOUNTER — TELEPHONE (OUTPATIENT)
Dept: FAMILY MEDICINE | Facility: CLINIC | Age: 24
End: 2019-01-02

## 2019-01-02 DIAGNOSIS — E11.9 TYPE 2 DIABETES MELLITUS WITHOUT COMPLICATION, WITHOUT LONG-TERM CURRENT USE OF INSULIN (H): Primary | ICD-10-CM

## 2019-01-02 NOTE — TELEPHONE ENCOUNTER
Alternative requested. Pt must use one touch per instructions, is there a reason you wanted ACCU-CHECK? Let us know.  Thank you   Sincerely,   Your local [facility] Pharmacist

## 2019-01-02 NOTE — TELEPHONE ENCOUNTER
Please notify prescription sent to pharmacy for the test strips. The computer did not have the requested ones and I wrote it in. If there is an issue have our Dab RN Miley help him with these.   Alfredo Chiu

## 2019-02-01 ENCOUNTER — OFFICE VISIT (OUTPATIENT)
Dept: FAMILY MEDICINE | Facility: CLINIC | Age: 24
End: 2019-02-01
Payer: COMMERCIAL

## 2019-02-01 VITALS
RESPIRATION RATE: 12 BRPM | BODY MASS INDEX: 33.49 KG/M2 | OXYGEN SATURATION: 98 % | WEIGHT: 221 LBS | HEART RATE: 91 BPM | HEIGHT: 68 IN | TEMPERATURE: 99.1 F | SYSTOLIC BLOOD PRESSURE: 122 MMHG | DIASTOLIC BLOOD PRESSURE: 88 MMHG

## 2019-02-01 DIAGNOSIS — R39.89 OTHER SYMPTOMS AND SIGNS INVOLVING THE GENITOURINARY SYSTEM: Primary | ICD-10-CM

## 2019-02-01 DIAGNOSIS — R30.0 BURNING WITH URINATION: ICD-10-CM

## 2019-02-01 DIAGNOSIS — Z11.3 SCREEN FOR STD (SEXUALLY TRANSMITTED DISEASE): ICD-10-CM

## 2019-02-01 LAB
ALBUMIN UR-MCNC: NEGATIVE MG/DL
APPEARANCE UR: CLEAR
BILIRUB UR QL STRIP: NEGATIVE
COLOR UR AUTO: YELLOW
GLUCOSE UR STRIP-MCNC: NEGATIVE MG/DL
HGB UR QL STRIP: NEGATIVE
KETONES UR STRIP-MCNC: NEGATIVE MG/DL
LEUKOCYTE ESTERASE UR QL STRIP: NEGATIVE
NITRATE UR QL: NEGATIVE
PH UR STRIP: 5.5 PH (ref 5–7)
SOURCE: NORMAL
SP GR UR STRIP: <=1.005 (ref 1–1.03)
UROBILINOGEN UR STRIP-ACNC: 0.2 EU/DL (ref 0.2–1)

## 2019-02-01 PROCEDURE — 81003 URINALYSIS AUTO W/O SCOPE: CPT | Performed by: NURSE PRACTITIONER

## 2019-02-01 PROCEDURE — 99213 OFFICE O/P EST LOW 20 MIN: CPT | Performed by: NURSE PRACTITIONER

## 2019-02-01 PROCEDURE — 87591 N.GONORRHOEAE DNA AMP PROB: CPT | Performed by: NURSE PRACTITIONER

## 2019-02-01 PROCEDURE — 87491 CHLMYD TRACH DNA AMP PROBE: CPT | Performed by: NURSE PRACTITIONER

## 2019-02-01 ASSESSMENT — MIFFLIN-ST. JEOR: SCORE: 1961.95

## 2019-02-01 NOTE — PATIENT INSTRUCTIONS
1.  Urine is negative for blood or infection.  2.  I will call you with STD testing on Monday.  3.  Use AZO over the counter for burning with urination as needed.  4.  If testing is negative, I recommend follow-up with urology.  Consult placed to make appointment if needed.

## 2019-02-01 NOTE — PROGRESS NOTES
"  SUBJECTIVE:   Tree Conti is a 23 year old male who presents to clinic today for the following health issues:      Genitourinary - Male  Onset: 1 week    Description: Pt noticed pain with urination last week. He increased fluids which helped minimally, but is now having blood while urination. He is here today to get STD and UA testing completed. Pt states that his girlfriend has been checked since they started seeing each other within the last 2 months and tested negative for STD's.  Dysuria (painful urination): YES  Hematuria (blood in urine): YES  Frequency: YES- But he has been drinking a lot of water.  Are you urinating at night : YES  Hesitancy (delay in urine): YES  Retention (unable to empty): no   Decrease in urinary flow: YES  Incontinence: no     Progression of Symptoms:  worsening    Accompanying Signs & Symptoms:  Fever: UNKNOWN -   Back/Flank pain: YES- Bilateral flank pain; achy with occasional sharp, questions muscular  Urethral discharge: no   Testicle lumps/masses/pain: YES- Pt reports some \"weird feeling\" in his testicles after intercourse or the next morning after intercourse  Nausea and/or vomiting: no   Abdominal pain: YES, Could be from his celiacs disease.    History:   History of frequent UTI's: no   History of kidney stones: no   History of hernias: no   Personal or Family history of Prostate problems: no    Sexually active: YES, new partner in the last couple months, girlfriend states that she was tested for STD's after that started having intercourse and was negative    Precipitating factors:   Frequency, dysuria, hematuria    Alleviating factors:  Drinking fluids has not helped.      Problem list and histories reviewed & adjusted, as indicated.  Additional history: as documented    Patient Active Problem List   Diagnosis     Celiac disease     CARDIOVASCULAR SCREENING; LDL GOAL LESS THAN 100     Tobacco abuse     SVT (supraventricular tachycardia) (H)     Uncontrolled type 1 diabetes " mellitus without complication (H)     Essential hypertension     Hyperlipidemia LDL goal <100     Past Surgical History:   Procedure Laterality Date     EP ABLATION / EP STUDIES  08/18/2016    AVRT ablation     HC TOOTH EXTRACTION W/FORCEP      april       Social History     Tobacco Use     Smoking status: Current Every Day Smoker     Packs/day: 0.25     Types: Cigarettes     Smokeless tobacco: Former User     Tobacco comment: 1/4 to 1/2 ppd.   Substance Use Topics     Alcohol use: No     Alcohol/week: 0.0 oz     Comment: 4-5 times per week, 5-6 drinks per time.- HAS NOT DRANK IN 3 months     Family History   Problem Relation Age of Onset     Heart Disease Mother          Current Outpatient Medications   Medication Sig Dispense Refill     blood glucose (ACCU-CHEK CHARLEE) test strip Use to test blood sugar 6-8 times daily 100 strip 0     blood glucose (NO BRAND SPECIFIED) lancets standard Use to test blood sugar 6-8 times daily 100 each 0     blood glucose (NO BRAND SPECIFIED) test strip Use to test blood sugar 3 times daily or as directed. 100 strip 11     blood glucose monitoring (ONE TOUCH DELICA) lancets Use to test blood sugar 4 times daily or as directed.  Ok to substitute alternative if insurance prefers. 100 each 11     blood glucose monitoring (ONETOUCH VERIO IQ) test strip USE TO TEST BLOOD SUGAR 6 TIMES DAILY OR AS DIRECTED. (VERIO) 600 strip 3     blood glucose monitoring (ONETOUCH VERIO SYNC SYSTEM) meter device kit Use to test blood sugars 4 daily or as directed.  Ok to substitute alternative if insurance prefers. 1 kit 1     DiphenhydrAMINE HCl (BENADRYL PO) Take 25 mg by mouth daily as needed       insulin aspart (NOVOLOG VIAL) 100 UNITS/ML injection USE 1 UNIT EVERY 10 GRAMS CARB. 1 UNIT FOR EVERY 50 POINTS OVER 150. UP TO 50 UNITS DAILY 50 mL 1     insulin detemir (LEVEMIR VIAL) 100 UNIT/ML injection INJECT 42 UNITS TWICE DAILY UNDER THE SKIN 80 mL 1     insulin syringe-needle U-100 (B-D INSULIN  "SYRINGE) 31G X 5/16\" 0.5 ML USE ONE SYRINGE 6 X DAILY OR AS DIRECTED. 540 each 3     STATIN NOT PRESCRIBED, INTENTIONAL, Statin not prescribed intentionally due to Not indicated based on age 0 each 0     Allergies   Allergen Reactions     Gluten Other (See Comments)     No Wheat or Gluten- Pt has celiac       Reviewed and updated as needed this visit by clinical staff  Tobacco  Allergies  Meds  Problems  Med Hx  Surg Hx  Fam Hx  Soc Hx        Reviewed and updated as needed this visit by Provider  Tobacco  Allergies  Meds  Problems  Med Hx  Surg Hx  Fam Hx         ROS:  CONSTITUTIONAL:POSITIVE  for fever low grade today  RESP: NEGATIVE for significant cough or SOB  CV: NEGATIVE for chest pain, palpitations or peripheral edema  : dysuria, frequency and blood in urine  PSYCHIATRIC: NEGATIVE for changes in mood or affect  ROS otherwise negative    OBJECTIVE:     /88   Pulse 91   Temp 99.1  F (37.3  C) (Tympanic)   Resp 12   Ht 1.727 m (5' 8\")   Wt 100.2 kg (221 lb)   SpO2 98%   BMI 33.60 kg/m    Body mass index is 33.6 kg/m .  GENERAL: healthy, alert and no distress  RESP: lungs clear to auscultation - no rales, rhonchi or wheezes  CV: regular rate and rhythm, normal S1 S2, no S3 or S4, no murmur, click or rub, no peripheral edema and peripheral pulses strong  ABDOMEN: soft, nontender, no hepatosplenomegaly, no masses and bowel sounds normal  PSYCH: mentation appears normal, affect normal/bright    Diagnostic Test Results:  Results for orders placed or performed in visit on 02/01/19 (from the past 24 hour(s))   *UA reflex to Microscopic and Culture ([facility] (except [place] and [place])   Result Value Ref Range    Color Urine Yellow     Appearance Urine Clear     Glucose Urine Negative NEG^Negative mg/dL    Bilirubin Urine Negative NEG^Negative    Ketones Urine Negative NEG^Negative mg/dL    Specific Gravity Urine <=1.005 1.003 - 1.035    Blood Urine Negative " NEG^Negative    pH Urine 5.5 5.0 - 7.0 pH    Protein Albumin Urine Negative NEG^Negative mg/dL    Urobilinogen Urine 0.2 0.2 - 1.0 EU/dL    Nitrite Urine Negative NEG^Negative    Leukocyte Esterase Urine Negative NEG^Negative    Source Midstream Urine        ASSESSMENT/PLAN:     1. Other symptoms and signs involving the genitourinary system  UA is negative.  - *UA reflex to Microscopic and Culture ([place] and [facility] (except [place] and [place])    2. Screen for STD (sexually transmitted disease)  Testing for Gonorrhoeae and Chlamydia pending.  I will call with results.  - Neisseria gonorrhoeae PCR  - Chlamydia trachomatis PCR    3. Burning with urination  Patient did have symptoms that are consistent with kidney stone being passed.  UA is normal with no hematuria at this time.  Recommended AZO for symptoms.  STD urine testing pending.  I will call him with results.  Recommend to push fluids and if symptoms are persistent and labs are normal, I have recommended f/u with urology for further evaluation.  - UROLOGY ADULT REFERRAL    See Patient Instructions    Elaine Payan NP  [facility]

## 2019-02-03 LAB
C TRACH DNA SPEC QL NAA+PROBE: NEGATIVE
N GONORRHOEA DNA SPEC QL NAA+PROBE: NEGATIVE
SPECIMEN SOURCE: NORMAL
SPECIMEN SOURCE: NORMAL

## 2019-03-21 DIAGNOSIS — Z13.6 CARDIOVASCULAR SCREENING; LDL GOAL LESS THAN 100: ICD-10-CM

## 2019-03-21 NOTE — TELEPHONE ENCOUNTER
"Requested Prescriptions   Pending Prescriptions Disp Refills     insulin detemir (LEVEMIR VIAL) 100 UNIT/ML vial [Pharmacy Med Name: LEVEMIR 100 UNITS/ML VIAL] 80 mL 1     Sig: INJECT 42 UNITS TWICE DAILY UNDER THE SKIN    Long Acting Insulin Protocol Failed - 3/21/2019  1:25 AM       Failed - Microalbumin on file in past 12 months    Recent Labs   Lab Test 12/08/17  1456   MICROL 8   UMALCR 10.48            Failed - HgbA1C in past 3 or 6 months    If HgbA1C is 8 or greater, it needs to be on file within the past 3 months.  If less than 8, must be on file within the past 6 months.     Recent Labs   Lab Test 07/27/18  1540   A1C 7.0*            Passed - Blood pressure less than 140/90 in past 6 months    BP Readings from Last 3 Encounters:   02/01/19 122/88   12/01/18 (!) 132/91   07/27/18 (!) 130/94                Passed - LDL on file in past 12 months    Recent Labs   Lab Test 07/27/18  1540   *            Passed - Serum creatinine on file in past 12 months    Recent Labs   Lab Test 05/10/18  1550   CR 0.84            Passed - Medication is active on med list       Passed - Patient is age 18 or older       Passed - Recent (6 mo) or future (30 days) visit within the authorizing provider's specialty    Patient had office visit in the last 6 months or has a visit in the next 30 days with authorizing provider or within the authorizing provider's specialty.  See \"Patient Info\" tab in inbasket, or \"Choose Columns\" in Meds & Orders section of the refill encounter.            Last Written Prescription Date:  7/31/18  Last Fill Quantity: 80,  # refills: 1   Last office visit: 2/1/2019 with prescribing provider:  Aram   Future Office Visit:      "

## 2019-03-22 NOTE — TELEPHONE ENCOUNTER
Provider covering for Dr. Chiu,    I have called and spoken to the patient.  I advised the patient that he is in need of labs and an office visit.  I asked if he still had insulin as we have received a refill request.  He states he will need a refill of the levemir.  I have offered to help schedule him for an appt and he states he needs to check his schedule.  Please advise on the refill, looks like a vial of the levemir will last him 3 months.  Kusum GREGORIO RN

## 2019-03-29 ENCOUNTER — TELEPHONE (OUTPATIENT)
Dept: FAMILY MEDICINE | Facility: CLINIC | Age: 24
End: 2019-03-29

## 2019-03-29 DIAGNOSIS — I10 ESSENTIAL HYPERTENSION: ICD-10-CM

## 2019-03-29 DIAGNOSIS — E78.5 HYPERLIPIDEMIA LDL GOAL <100: Primary | ICD-10-CM

## 2019-03-29 NOTE — TELEPHONE ENCOUNTER
If patient is looking for diabetes medication, patient needs Office visit and labs, patient is overdue.    Notes Recorded by Alfredo Chiu MD on 7/30/2018 at 11:54 AM  Call results. The A1c is better. Continue the same therapies there.   The LDL chol is 131 and the goal is under 100. He needs to start Lipitor at 10 mg daily.   Please notify prescription sent to pharmacy for this med. Use the lower chol diet.   The repeat lipids are ordered for 3-4 months from now. Have him call for that. We will call the results.   Alfredo Chiu    Attempted to contact patient, no answer, left voice message to call back.

## 2019-03-29 NOTE — TELEPHONE ENCOUNTER
Reason for Call: Request for an order or referral:    Order or referral being requested: Patient is asking if he can get labs done to get his mediations or if he has to be seen.    Date needed: before my next appointment    Has the patient been seen by the PCP for this problem? NO    Phone number Patient can be reached at:  Home number on file [phone] (home)    Best Time:  any    Can we leave a detailed message on this number?  YES    Call taken on 3/29/2019 at 2:06 PM by Carly Villalta

## 2019-03-29 NOTE — TELEPHONE ENCOUNTER
Pt called back regarding refills of insulin.  He is a type 1 diabetic and was told on 3/21/19 that he needed to be seen.  He was given a one-month refill.     Pt states that he needs to see a care provider who offers late appts because he works until 5:30pm.  Pt says that he has been missing a lot of work and is worried he could lose his job if he has to miss more work.     Pt has scheduled his lab work on 4/6/19.  He wanted to know if this would suffice?     Informed that it is VERY important as a type 1 diabetic to be following up as advised for best management and outcomes for his diabetes.     Pt is scheduled to see Dr Torres on 4/15/19 at 6 pm to accommodate his work schedule.  Pt says that he works until 5:30 pm Mon - Fri.    Future lab orders placed.   Pt made lab appt on 4/6/19.  Sandrine Hummel RN

## 2019-03-29 NOTE — TELEPHONE ENCOUNTER
Pt called back regarding refills of insulin.  He is a type 1 diabetic and was told on 3/21/19 that he needed to be seen.  He was given a one-month refill.    Pt states that he needs to see a care provider who offers late appts because he works until 5:30pm.  Pt says that he has been missing a lot of work and is worried he could lose his job if he has to miss more work.    Pt has scheduled his lab work on 4/6/19.  He wanted to know if this would suffice?    Informed that it is VERY important as a type 1 diabetic to be following up as advised for best management and outcomes for his diabetes.    Pt is scheduled to see Dr Torres on 4/15/19 at 6 pm to accommodate his work schedule.  Pt says that he works until 5:30 pm Mon - Fri.    Lab Results   Component Value Date    A1C 7.0 07/27/2018    A1C 7.2 12/08/2017    A1C 7.3 10/28/2016    A1C 8.6 05/20/2016    A1C 8.6 09/21/2015     Recent Labs   Lab Test 07/27/18  1540 12/08/17  1455  09/03/13  1409   CHOL 186 163   < > 228*   HDL 36* 36*   < > 55   * 105*   < > 158*   TRIG 96 108   < > 72   CHOLHDLRATIO  --   --   --  4.0    < > = values in this interval not displayed.   '

## 2019-04-06 DIAGNOSIS — E78.5 HYPERLIPIDEMIA LDL GOAL <100: ICD-10-CM

## 2019-04-06 DIAGNOSIS — I10 ESSENTIAL HYPERTENSION: ICD-10-CM

## 2019-04-06 LAB
ANION GAP SERPL CALCULATED.3IONS-SCNC: 6 MMOL/L (ref 3–14)
BUN SERPL-MCNC: 17 MG/DL (ref 7–30)
CALCIUM SERPL-MCNC: 8.6 MG/DL (ref 8.5–10.1)
CHLORIDE SERPL-SCNC: 106 MMOL/L (ref 94–109)
CHOLEST SERPL-MCNC: 175 MG/DL
CO2 SERPL-SCNC: 25 MMOL/L (ref 20–32)
CREAT SERPL-MCNC: 0.78 MG/DL (ref 0.66–1.25)
CREAT UR-MCNC: 147 MG/DL
GFR SERPL CREATININE-BSD FRML MDRD: >90 ML/MIN/{1.73_M2}
GLUCOSE SERPL-MCNC: 102 MG/DL (ref 70–99)
HBA1C MFR BLD: 6.7 % (ref 0–5.6)
HDLC SERPL-MCNC: 41 MG/DL
LDLC SERPL CALC-MCNC: 120 MG/DL
MICROALBUMIN UR-MCNC: 6 MG/L
MICROALBUMIN/CREAT UR: 4.31 MG/G CR (ref 0–17)
NONHDLC SERPL-MCNC: 134 MG/DL
POTASSIUM SERPL-SCNC: 4.1 MMOL/L (ref 3.4–5.3)
SODIUM SERPL-SCNC: 137 MMOL/L (ref 133–144)
TRIGL SERPL-MCNC: 68 MG/DL

## 2019-04-06 PROCEDURE — 80061 LIPID PANEL: CPT | Performed by: FAMILY MEDICINE

## 2019-04-06 PROCEDURE — 83036 HEMOGLOBIN GLYCOSYLATED A1C: CPT | Performed by: FAMILY MEDICINE

## 2019-04-06 PROCEDURE — 82043 UR ALBUMIN QUANTITATIVE: CPT | Performed by: FAMILY MEDICINE

## 2019-04-06 PROCEDURE — 80048 BASIC METABOLIC PNL TOTAL CA: CPT | Performed by: FAMILY MEDICINE

## 2019-04-06 PROCEDURE — 36415 COLL VENOUS BLD VENIPUNCTURE: CPT | Performed by: FAMILY MEDICINE

## 2019-04-13 DIAGNOSIS — Z13.6 CARDIOVASCULAR SCREENING; LDL GOAL LESS THAN 100: ICD-10-CM

## 2019-04-15 ENCOUNTER — OFFICE VISIT (OUTPATIENT)
Dept: FAMILY MEDICINE | Facility: CLINIC | Age: 24
End: 2019-04-15
Payer: COMMERCIAL

## 2019-04-15 VITALS
HEART RATE: 94 BPM | HEIGHT: 68 IN | SYSTOLIC BLOOD PRESSURE: 138 MMHG | DIASTOLIC BLOOD PRESSURE: 84 MMHG | WEIGHT: 215 LBS | TEMPERATURE: 98.1 F | RESPIRATION RATE: 16 BRPM | OXYGEN SATURATION: 97 % | BODY MASS INDEX: 32.58 KG/M2

## 2019-04-15 DIAGNOSIS — L81.9 PIGMENTED SKIN LESIONS: ICD-10-CM

## 2019-04-15 DIAGNOSIS — E10.9 CONTROLLED TYPE 1 DIABETES MELLITUS WITHOUT COMPLICATION, WITH LONG-TERM CURRENT USE OF INSULIN (H): Primary | ICD-10-CM

## 2019-04-15 PROCEDURE — 99214 OFFICE O/P EST MOD 30 MIN: CPT | Performed by: FAMILY MEDICINE

## 2019-04-15 PROCEDURE — 99207 C FOOT EXAM  NO CHARGE: CPT | Performed by: FAMILY MEDICINE

## 2019-04-15 ASSESSMENT — MIFFLIN-ST. JEOR: SCORE: 1934.73

## 2019-04-15 NOTE — TELEPHONE ENCOUNTER
"Requested Prescriptions   Pending Prescriptions Disp Refills     insulin detemir (LEVEMIR VIAL) 100 UNIT/ML vial [Pharmacy Med Name: LEVEMIR 100 UNIT/ML VIAL] 10 mL 0     Sig: INJECT 42 UNITS TWICE DAILY UNDER THE SKIN, NEED OFFICE VISIT AND LABS   Last Written Prescription Date:  3/22/19  Last Fill Quantity: 10 ml,  # refills: 0   Last office visit: 2/1/2019 with prescribing provider:  OG Payan   Future Office Visit:   Next 5 appointments (look out 90 days)    Apr 15, 2019  6:00 PM CDT  SHORT with Polo Torres MD  [facility] ([facility]) [address] [facility] [address]  [phone]             Long Acting Insulin Protocol Passed - 4/13/2019  8:37 AM        Passed - Blood pressure less than 140/90 in past 6 months     BP Readings from Last 3 Encounters:   02/01/19 122/88   12/01/18 (!) 132/91   07/27/18 (!) 130/94                 Passed - LDL on file in past 12 months     Recent Labs   Lab Test 04/06/19  1026   *             Passed - Microalbumin on file in past 12 months     Recent Labs   Lab Test 04/06/19  1026   MICROL 6   UMALCR 4.31             Passed - Serum creatinine on file in past 12 months     Recent Labs   Lab Test 04/06/19  1026   CR 0.78             Passed - HgbA1C in past 3 or 6 months     If HgbA1C is 8 or greater, it needs to be on file within the past 3 months.  If less than 8, must be on file within the past 6 months.     Recent Labs   Lab Test 04/06/19  1026   A1C 6.7*             Passed - Medication is active on med list        Passed - Patient is age 18 or older        Passed - Recent (6 mo) or future (30 days) visit within the authorizing provider's specialty     Patient had office visit in the last 6 months or has a visit in the next 30 days with authorizing provider or within the authorizing provider's specialty.  See \"Patient Info\" tab in inbasket, or \"Choose Columns\" in Meds & Orders section of the refill " encounter.

## 2019-04-15 NOTE — PROGRESS NOTES
SUBJECTIVE:   Tree Conti is a 23 year old male who presents to clinic today for the following   health issues:    Chief Complaint   Patient presents with     Diabetes     Recheck and refill meds. Labs already done.     Diabetes Follow-up      Patient is checking blood sugars: rarely.  Results range from 60's to 150's. Checking about 5-6 times.    AM: 70's 80's   PM: 100's-150's     Diabetic concerns: None     Symptoms of hypoglycemia (low blood sugar): lethargy, knows when its coming and what to do.     Paresthesias (numbness or burning in feet) or sores: Yes both feet, brown/ rash on tops of feet.     Date of last diabetic eye exam: Don't know, was [facility]. Doesn't have eye insurance coverage.    Patient denies polyuria, polydipsia, polyphagia, dizziness, LOC, HA, BOV, numbness or tingling in extremities    Diabetes Management Resources    Patient is running out of levemir in a few days.  Patient is on levemir 40 units BID.    BP Readings from Last 2 Encounters:   04/15/19 138/84   02/01/19 122/88     Hemoglobin A1C (%)   Date Value   04/06/2019 6.7 (H)   07/27/2018 7.0 (H)     LDL Cholesterol Calculated (mg/dL)   Date Value   04/06/2019 120 (H)   07/27/2018 131 (H)       Amount of exercise or physical activity: 4-5 days/week for an average of 15-30 minutes    Problems taking medications regularly: No    Medication side effects: none    Diet: regular (no restrictions)        Additional history: as documented    Reviewed  and updated as needed this visit by clinical staff  Tobacco  Allergies  Meds  Problems  Med Hx  Surg Hx  Fam Hx  Soc Hx          Reviewed and updated as needed this visit by Provider  Tobacco  Allergies  Meds  Problems  Med Hx  Surg Hx  Fam Hx         Patient Active Problem List   Diagnosis     Celiac disease     CARDIOVASCULAR SCREENING; LDL GOAL LESS THAN 100     Tobacco abuse     SVT (supraventricular tachycardia) (H)     Controlled type 1 diabetes mellitus without  complication, with long-term current use of insulin (H)     Essential hypertension     Hyperlipidemia LDL goal <100     Pigmented skin lesions     Past Surgical History:   Procedure Laterality Date     EP ABLATION / EP STUDIES  08/18/2016    AVRT ablation     HC TOOTH EXTRACTION W/FORCEP      april       Social History     Tobacco Use     Smoking status: Current Every Day Smoker     Packs/day: 0.25     Types: Cigarettes     Smokeless tobacco: Former User     Tobacco comment: 1/4 to 1/2 ppd.   Substance Use Topics     Alcohol use: Not on file     Comment: 4-5 times per week, 5-6 drinks per time.- HAS NOT DRANK IN 3 months     Family History   Problem Relation Age of Onset     Heart Disease Mother          Current Outpatient Medications   Medication Sig Dispense Refill     DiphenhydrAMINE HCl (BENADRYL PO) Take 25 mg by mouth daily as needed       insulin aspart (NOVOLOG VIAL) 100 UNITS/ML vial USE 1 UNIT EVERY 10 GRAMS CARB. 1 UNIT FOR EVERY 50 POINTS OVER 150. UP TO 50 UNITS DAILY 50 mL 1     insulin detemir (LEVEMIR VIAL) 100 UNIT/ML vial INJECT 42 UNITS TWICE DAILY UNDER THE SKIN 50 mL 1     blood glucose (ACCU-CHEK CHARLEE) test strip Use to test blood sugar 6-8 times daily 100 strip 0     blood glucose (NO BRAND SPECIFIED) lancets standard Use to test blood sugar 6-8 times daily 100 each 0     blood glucose (NO BRAND SPECIFIED) test strip Use to test blood sugar 3 times daily or as directed. 100 strip 11     blood glucose monitoring (ONE TOUCH DELICA) lancets Use to test blood sugar 4 times daily or as directed.  Ok to substitute alternative if insurance prefers. 100 each 11     blood glucose monitoring (ONETOUCH VERIO IQ) test strip USE TO TEST BLOOD SUGAR 6 TIMES DAILY OR AS DIRECTED. (VERIO) 600 strip 3     blood glucose monitoring (ONETOUCH VERIO SYNC SYSTEM) meter device kit Use to test blood sugars 4 daily or as directed.  Ok to substitute alternative if insurance prefers. 1 kit 1     insulin detemir  "(LEVEMIR VIAL) 100 UNIT/ML vial INJECT 42 UNITS TWICE DAILY UNDER THE SKIN, NEED OFFICE VISIT AND LABS 10 mL 1     insulin syringe-needle U-100 (B-D INSULIN SYRINGE) 31G X 5/16\" 0.5 ML USE ONE SYRINGE 6 X DAILY OR AS DIRECTED. 540 each 3     STATIN NOT PRESCRIBED, INTENTIONAL, Statin not prescribed intentionally due to Not indicated based on age 0 each 0     Allergies   Allergen Reactions     Gluten Other (See Comments)     No Wheat or Gluten- Pt has celiac       ROS:  CONSTITUTIONAL: NEGATIVE for fever, chills, change in weight  INTEGUMENTARY/SKIN: brown spots on both feet  EYES: NEGATIVE for vision changes or irritation  ENT/MOUTH: NEGATIVE for ear, mouth and throat problems  RESP: NEGATIVE for significant cough or SOB  CV: NEGATIVE for chest pain, palpitations or peripheral edema  GI: NEGATIVE for nausea, abdominal pain, heartburn, or change in bowel habits  : NEGATIVE for frequency, dysuria, or hematuria  MUSCULOSKELETAL: NEGATIVE for significant arthralgias or myalgia  NEURO: NEGATIVE for weakness, dizziness or paresthesias  ENDOCRINE: NEGATIVE for temperature intolerance, skin/hair changes  HEME: NEGATIVE for bleeding problems  PSYCHIATRIC: NEGATIVE for changes in mood or affect    OBJECTIVE:                                                    /84   Pulse 94   Temp 98.1  F (36.7  C) (Tympanic)   Resp 16   Ht 1.727 m (5' 8\")   Wt 97.5 kg (215 lb)   SpO2 97%   BMI 32.69 kg/m    Body mass index is 32.69 kg/m .  GENERAL: obese, alert and no distress, ambulatory w/o assist  EYES: no icterus; pink conjunctivae.  NECK: no tenderness, no adenopathy,  Thyroid not enlarged  RESP: lungs clear to auscultation - no rales, no rhonchi, no wheezes  CV: regular rates and rhythm, no murmur  MS: no edema  SKIN: no suspicious lesions, no rashes; on dorsum of forefoot bilaterally, there are lightly hyperpigmented non-scaly macules  NEURO: strength and tone- normal, sensory exam- grossly normal, mentation- intact, " speech- normal, reflexes- symmetric  ABD:  nontender  FOOT EXAM: no ulcer/erythema; pedal pulses strong bilaterally; monofilament: no deficit    Diagnostic test results:  Diagnostic Test Results:  Results for orders placed or performed in visit on 04/06/19   Albumin Random Urine Quantitative with Creat Ratio   Result Value Ref Range    Creatinine Urine 147 mg/dL    Albumin Urine mg/L 6 mg/L    Albumin Urine mg/g Cr 4.31 0 - 17 mg/g Cr   Basic metabolic panel  (Ca, Cl, CO2, Creat, Gluc, K, Na, BUN)   Result Value Ref Range    Sodium 137 133 - 144 mmol/L    Potassium 4.1 3.4 - 5.3 mmol/L    Chloride 106 94 - 109 mmol/L    Carbon Dioxide 25 20 - 32 mmol/L    Anion Gap 6 3 - 14 mmol/L    Glucose 102 (H) 70 - 99 mg/dL    Urea Nitrogen 17 7 - 30 mg/dL    Creatinine 0.78 0.66 - 1.25 mg/dL    GFR Estimate >90 >60 mL/min/[1.73_m2]    GFR Estimate If Black >90 >60 mL/min/[1.73_m2]    Calcium 8.6 8.5 - 10.1 mg/dL   Hemoglobin A1c   Result Value Ref Range    Hemoglobin A1C 6.7 (H) 0 - 5.6 %   Lipid panel reflex to direct LDL Fasting   Result Value Ref Range    Cholesterol 175 <200 mg/dL    Triglycerides 68 <150 mg/dL    HDL Cholesterol 41 >39 mg/dL    LDL Cholesterol Calculated 120 (H) <100 mg/dL    Non HDL Cholesterol 134 (H) <130 mg/dL        ASSESSMENT/PLAN:                                                        ICD-10-CM    1. Controlled type 1 diabetes mellitus without complication, with long-term current use of insulin (H) E10.9 insulin detemir (LEVEMIR VIAL) 100 UNIT/ML vial     FOOT EXAM     insulin aspart (NOVOLOG VIAL) 100 UNITS/ML vial  Reinforced carbohydrate control.  Regular exercise discussed. Regular foot care, annual eye exam.  Flu vaccine every year.     2. Pigmented skin lesions L81.9 No red flags.   Likely benign. Patient preferred expectant management.  Sunscreen use reinforced.  Return precautions discussed and given to patient.         Follow up with Provider - 3-6 months.    Patient Instructions    Continue current treatment for diabetes.    Be consistent with low salt, low trans fat and low saturated fat diet.  Eat food rich in omega-3-fatty acids as you tolerate. (salmon, olive oil)  Eat 5 cups of vegetables, fruits and whole grains per day.  Limit starchy food (white rice, white bread, white pasta, white potatoes) to less than a cup per meal.  Minimize sweets, junk food and fastfood. Limit soda beverages to one serving per day; best to avoid it altogether though.    Exercise: moderate intensity sustained for at least 30 mins per episode, goal of 150 mins per week at least  Combine cardiovascular and resistance exercises.  These exercise recommendations are in addition to your daily activity at work or home.  Work on losing weight.      Regular foot care; don't walk barefoot  Annual eye exam.    Flu vaccine every fall (October-November).    Blood tests: repeat in September 2019.    You deferred any consult with dermatology for the brown spots on the feet. Use sunscreen liberally when your skin is exposed to the sun.    If with rapid skin pigmentation changes, see provider again.      Polo Torres MD  [facility]

## 2019-04-15 NOTE — PATIENT INSTRUCTIONS
Continue current treatment for diabetes.    Be consistent with low salt, low trans fat and low saturated fat diet.  Eat food rich in omega-3-fatty acids as you tolerate. (salmon, olive oil)  Eat 5 cups of vegetables, fruits and whole grains per day.  Limit starchy food (white rice, white bread, white pasta, white potatoes) to less than a cup per meal.  Minimize sweets, junk food and fastfood. Limit soda beverages to one serving per day; best to avoid it altogether though.    Exercise: moderate intensity sustained for at least 30 mins per episode, goal of 150 mins per week at least  Combine cardiovascular and resistance exercises.  These exercise recommendations are in addition to your daily activity at work or home.  Work on losing weight.      Regular foot care; don't walk barefoot  Annual eye exam.    Flu vaccine every fall (October-November).    Blood tests: repeat in September 2019.    You deferred any consult with dermatology for the brown spots on the feet. Use sunscreen liberally when your skin is exposed to the sun.    If with rapid skin pigmentation changes, see provider again.

## 2019-08-20 DIAGNOSIS — E10.9 CONTROLLED TYPE 1 DIABETES MELLITUS WITHOUT COMPLICATION, WITH LONG-TERM CURRENT USE OF INSULIN (H): ICD-10-CM

## 2019-08-20 NOTE — TELEPHONE ENCOUNTER
"Requested Prescriptions   Pending Prescriptions Disp Refills     insulin detemir (LEVEMIR VIAL) 100 UNIT/ML vial [Pharmacy Med Name: LEVEMIR 100 UNIT/ML VIAL] 50 mL 1     Sig: INJECT 42 UNITS TWICE DAILY UNDER THE SKIN   Last Written Prescription Date:  4/17/19  Last Fill Quantity: 10 ml,  # refills: 1   Last office visit: 4/15/2019 with prescribing provider:  NADIRA Torres   Future Office Visit:        Long Acting Insulin Protocol Passed - 8/20/2019  1:34 AM        Passed - Blood pressure less than 140/90 in past 6 months     BP Readings from Last 3 Encounters:   04/15/19 138/84   02/01/19 122/88   12/01/18 (!) 132/91                 Passed - LDL on file in past 12 months     Recent Labs   Lab Test 04/06/19  1026   *             Passed - Microalbumin on file in past 12 months     Recent Labs   Lab Test 04/06/19  1026   MICROL 6   UMALCR 4.31             Passed - Serum creatinine on file in past 12 months     Recent Labs   Lab Test 04/06/19  1026   CR 0.78             Passed - HgbA1C in past 3 or 6 months     If HgbA1C is 8 or greater, it needs to be on file within the past 3 months.  If less than 8, must be on file within the past 6 months.     Recent Labs   Lab Test 04/06/19  1026   A1C 6.7*             Passed - Medication is active on med list        Passed - Patient is age 18 or older        Passed - Recent (6 mo) or future (30 days) visit within the authorizing provider's specialty     Patient had office visit in the last 6 months or has a visit in the next 30 days with authorizing provider or within the authorizing provider's specialty.  See \"Patient Info\" tab in inbasket, or \"Choose Columns\" in Meds & Orders section of the refill encounter.              "

## 2019-10-08 ENCOUNTER — TELEPHONE (OUTPATIENT)
Dept: FAMILY MEDICINE | Facility: CLINIC | Age: 24
End: 2019-10-08

## 2019-10-08 DIAGNOSIS — E10.9 CONTROLLED TYPE 1 DIABETES MELLITUS WITHOUT COMPLICATION, WITH LONG-TERM CURRENT USE OF INSULIN (H): Primary | ICD-10-CM

## 2019-10-08 DIAGNOSIS — I10 ESSENTIAL HYPERTENSION: ICD-10-CM

## 2019-10-08 NOTE — TELEPHONE ENCOUNTER
Reason for Call: Request for an order or referral:    Order or referral being requested: Patient would like to have labs drawn before appointment. The labs he has done in the past. Patient is unsure.  Patient would like lab scheduled the same day as his appointment.    Date needed: at your convenience    Has the patient been seen by the PCP for this problem? NO    Phone number Patient can be reached at:  Home number on file [phone] (home)    Best Time:  any    Can we leave a detailed message on this number?  YES    Call taken on 10/8/2019 at 3:21 PM by Carly Villalta

## 2019-10-08 NOTE — TELEPHONE ENCOUNTER
Patient last had labs completed 4/6/19. BMP, Lipids, Albumin, HgbA1C.   Unsure what provider would like ordered for upcoming appointment.   Pended hgba1c, and BMP as there were abnormals.

## 2019-10-09 NOTE — TELEPHONE ENCOUNTER
"Notified him, \"Ok, I was just going to do them after the appointment anyway, cause the appt is right when I get off work, so, ok, thanks for calling. \"    Odessa Charles RNC    "

## 2019-10-13 DIAGNOSIS — E10.9 CONTROLLED TYPE 1 DIABETES MELLITUS WITHOUT COMPLICATION, WITH LONG-TERM CURRENT USE OF INSULIN (H): ICD-10-CM

## 2019-10-13 NOTE — TELEPHONE ENCOUNTER
"Requested Prescriptions   Pending Prescriptions Disp Refills     BD INSULIN SYRINGE U/F 31G X 5/16\" 0.5 ML miscellaneous [Pharmacy Med Name: BD INS SYRNG UF 0.5 ML [id number]]  Last Written Prescription Date:  06/13/19  Last Fill Quantity: 500,  # refills: 3   Last office visit: 4/15/2019 with prescribing provider:  FITZ Torres   Future Office Visit:   Next 5 appointments (look out 90 days)    Oct 23, 2019  3:40 PM CDT  [facility] Alfredo [facility]iu [facility]  Arrive at: Clinic [facility] [address] [facility] [address]  [phone]           3     Sig: USE ONE SYRINGE 6 TIMES DAILY OR AS DIRECTED.       Diabetic Supplies Protocol Passed - 10/13/2019  8:19 AM        Passed - Medication is active on med list        Passed - Patient is 18 years of age or older        Passed - Recent (6 mo) or future (30 days) visit within the authorizing provider's specialty     Patient had office visit in the last 6 months or has a visit in the next 30 days with authorizing provider.  See \"Patient Info\" tab in inbasket, or \"Choose Columns\" in Meds & Orders section of the refill encounter.              "

## 2019-10-13 NOTE — TELEPHONE ENCOUNTER
"Requested Prescriptions   Pending Prescriptions Disp Refills     insulin detemir (LEVEMIR VIAL) 100 UNIT/ML vial [Pharmacy Med Name: LEVEMIR 100 UNIT/ML VIAL]  Last Written Prescription Date:  06/12/19  Last Fill Quantity: 50,  # refills: 1   Last office visit: 4/15/2019 with prescribing provider:  FITZ Torres   Future Office Visit:   Next 5 appointments (look out 90 days)    Oct 23, 2019  3:40 PM CDT  [facility]  Arrive at: Clinic [facility] [address] [facility] [address]  [phone]          50 mL 1     Sig: INJECT 42 UNITS TWICE DAILY UNDER THE SKIN       Long Acting Insulin Protocol Failed - 10/13/2019  8:22 AM        Failed - Blood pressure less than 140/90 in past 6 months     BP Readings from Last 3 Encounters:   04/15/19 138/84   02/01/19 122/88   12/01/18 (!) 132/91                 Failed - HgbA1C in past 3 or 6 months     If HgbA1C is 8 or greater, it needs to be on file within the past 3 months.  If less than 8, must be on file within the past 6 months.     Recent Labs   Lab Test 04/06/19  1026   A1C 6.7*             Passed - LDL on file in past 12 months     Recent Labs   Lab Test 04/06/19  1026   *             Passed - Microalbumin on file in past 12 months     Recent Labs   Lab Test 04/06/19  1026   MICROL 6   UMALCR 4.31             Passed - Serum creatinine on file in past 12 months     Recent Labs   Lab Test 04/06/19  1026   CR 0.78             Passed - Medication is active on med list        Passed - Patient is age 18 or older        Passed - Recent (6 mo) or future (30 days) visit within the authorizing provider's specialty     Patient had office visit in the last 6 months or has a visit in the next 30 days with authorizing provider or within the authorizing provider's specialty.  See \"Patient Info\" tab in inbasket, or \"Choose Columns\" in Meds & Orders section of the refill encounter.              "

## 2019-10-16 RX ORDER — PEN NEEDLE, DIABETIC 29 G X1/2"
NEEDLE, DISPOSABLE MISCELLANEOUS
Qty: 100 EACH | Refills: 0 | Status: SHIPPED | OUTPATIENT
Start: 2019-10-16 | End: 2019-10-23

## 2019-10-16 NOTE — TELEPHONE ENCOUNTER
Medication is being filled for 1 time refill only due to:  Patient needs to be seen because due for DM recheck..   Has pending appt 10-23-19.  Ninoska RODRÍGUEZ RN

## 2019-10-16 NOTE — TELEPHONE ENCOUNTER
Medication is being filled for 1 time refill only due to:  Patient needs to be seen because due for DM recheck.   Has pending appt 10-23-19.  Ninoska RODRÍGUEZ RN

## 2019-10-23 ENCOUNTER — OFFICE VISIT (OUTPATIENT)
Dept: FAMILY MEDICINE | Facility: CLINIC | Age: 24
End: 2019-10-23
Payer: COMMERCIAL

## 2019-10-23 VITALS
DIASTOLIC BLOOD PRESSURE: 92 MMHG | TEMPERATURE: 98.2 F | OXYGEN SATURATION: 98 % | BODY MASS INDEX: 32.43 KG/M2 | HEIGHT: 68 IN | HEART RATE: 76 BPM | RESPIRATION RATE: 16 BRPM | WEIGHT: 214 LBS | SYSTOLIC BLOOD PRESSURE: 130 MMHG

## 2019-10-23 DIAGNOSIS — E10.9 CONTROLLED TYPE 1 DIABETES MELLITUS WITHOUT COMPLICATION, WITH LONG-TERM CURRENT USE OF INSULIN (H): ICD-10-CM

## 2019-10-23 LAB
HBA1C MFR BLD: 7.1 % (ref 0–5.6)
TSH SERPL DL<=0.005 MIU/L-ACNC: 0.99 MU/L (ref 0.4–4)

## 2019-10-23 PROCEDURE — 83036 HEMOGLOBIN GLYCOSYLATED A1C: CPT | Performed by: FAMILY MEDICINE

## 2019-10-23 PROCEDURE — 36415 COLL VENOUS BLD VENIPUNCTURE: CPT | Performed by: FAMILY MEDICINE

## 2019-10-23 PROCEDURE — 85027 COMPLETE CBC AUTOMATED: CPT | Performed by: FAMILY MEDICINE

## 2019-10-23 PROCEDURE — 84443 ASSAY THYROID STIM HORMONE: CPT | Performed by: FAMILY MEDICINE

## 2019-10-23 PROCEDURE — 99214 OFFICE O/P EST MOD 30 MIN: CPT | Performed by: FAMILY MEDICINE

## 2019-10-23 RX ORDER — SYRINGE-NEEDLE,INSULIN,0.5 ML 27GX1/2"
SYRINGE, EMPTY DISPOSABLE MISCELLANEOUS
Qty: 500 EACH | Refills: 3 | Status: SHIPPED | OUTPATIENT
Start: 2019-10-23 | End: 2020-10-14

## 2019-10-23 ASSESSMENT — MIFFLIN-ST. JEOR: SCORE: 1930.2

## 2019-10-23 NOTE — LETTER
October 24, 2019      Tree Conti  [address] [place] [id number]        Dear ,    We are writing to inform you of your test results.  The blood count and thyroid are normal. The A1c is slightly higher.   He should follow the diet and exercise and weight control recommendations carefully.   No med changes. .Alfredo Chiu MD     Resulted Orders   **TSH with free T4 reflex FUTURE anytime   Result Value Ref Range    TSH 0.99 0.40 - 4.00 mU/L   **CBC with platelets FUTURE anytime   Result Value Ref Range    WBC 9.5 4.0 - 11.0 10e9/L    RBC Count 5.31 4.4 - 5.9 10e12/L    Hemoglobin 16.2 13.3 - 17.7 g/dL    Hematocrit 46.9 40.0 - 53.0 %    MCV 88 78 - 100 fl    MCH 30.5 26.5 - 33.0 pg    MCHC 34.5 31.5 - 36.5 g/dL    RDW 12.1 10.0 - 15.0 %    Platelet Count 268 150 - 450 10e9/L   **A1C FUTURE anytime   Result Value Ref Range    Hemoglobin A1C 7.1 (H) 0 - 5.6 %      Comment:      Normal <5.7% Prediabetes 5.7-6.4%  Diabetes 6.5% or higher - adopted from ADA   consensus guidelines.         If you have any questions or concerns, please call the clinic at the number listed above.     Sincerely,    Alfredo Chiu MD

## 2019-10-23 NOTE — PROGRESS NOTES
Subjective     Tree Conti is a 23 year old male who presents to clinic today for the following health issues:    HPI   Diabetes Follow-up      How often are you checking your blood sugar?6-8 times per day.    What time of day are you checking your blood sugars (select all that apply)?  Before meals and At bedtime    Have you had any blood sugars above 200?  Yes, only on occasion, not very often.  States his sugars will range from .    Have you had any blood sugars below 70?  Yes, on occasion.    What symptoms do you notice when your blood sugar is low?  Other: Can have a foggy, blurry vision, feeling when getting lower.    What concerns do you have today about your diabetes? None     Do you have any of these symptoms? (Select all that apply)  Numbness in feet, Burning in feet, Excessive thirst and Blurry vision.  For his feet he can have symptoms if his sugars are too high.  Excessive thirst when sugars are higher.  Blurry vision when sugars are lower.     Have you had a diabetic eye exam in the last 12 months? No    BP Readings from Last 2 Encounters:   10/23/19 (!) 130/92   04/15/19 138/84     Hemoglobin A1C (%)   Date Value   04/06/2019 6.7 (H)   07/27/2018 7.0 (H)     LDL Cholesterol Calculated (mg/dL)   Date Value   04/06/2019 120 (H)   07/27/2018 131 (H)       Diabetes Management Resources      How many servings of fruits and vegetables do you eat daily?  Not eating daily.    On average, how many sweetened beverages do you drink each day (soda, juice, sweet tea, etc)?   1-2 off and on and it is diet pop.    How many days per week do you miss taking your medication? 0      Current Outpatient Medications:      insulin aspart (NOVOLOG VIAL) 100 UNITS/ML vial, USE 1 UNIT EVERY 10 GRAMS CARB. 1 UNIT FOR EVERY 50 POINTS OVER 150. UP TO 50 UNITS DAILY, Disp: 30 mL, Rfl: 3     insulin detemir (LEVEMIR VIAL) 100 UNIT/ML vial, INJECT 42 UNITS TWICE DAILY UNDER THE SKIN, Disp: 80 mL, Rfl: 3     insulin  "syringe-needle U-100 (BD INSULIN SYRINGE U/F) 31G X 5/16\" 0.5 ML miscellaneous, USE ONE SYRINGE 6 TIMES DAILY OR AS DIRECTED., Disp: 500 each, Rfl: 3     STATIN NOT PRESCRIBED, INTENTIONAL,, Statin not prescribed intentionally due to Not indicated based on age, Disp: 0 each, Rfl: 0    Patient Active Problem List   Diagnosis     Celiac disease     CARDIOVASCULAR SCREENING; LDL GOAL LESS THAN 100     Tobacco abuse     SVT (supraventricular tachycardia) (H)     Controlled type 1 diabetes mellitus without complication, with long-term current use of insulin (H)     Essential hypertension     Hyperlipidemia LDL goal <100     Pigmented skin lesions       Blood pressure (!) 130/92, pulse 76, temperature 98.2  F (36.8  C), temperature source Tympanic, resp. rate 16, height 1.727 m (5' 8\"), weight 97.1 kg (214 lb), SpO2 98 %.    Exam:  GENERAL APPEARANCE: healthy, alert and no distress  EYES: EOMI,  PERRL  NECK: no adenopathy, no asymmetry, masses, or scars and thyroid normal to palpation  RESP: lungs clear to auscultation - no rales, rhonchi or wheezes  CV: regular rates and rhythm, normal S1 S2, no S3 or S4 and no murmur, click or rub -  SKIN: no suspicious lesions or rashes  PSYCH: mentation appears normal and affect normal/bright    Total time 25 minutes, greater than 50% in counseling    (E10.9) Controlled type 1 diabetes mellitus without complication, with long-term current use of insulin (H)  Comment:   Plan: AMBULATORY ADULT DIABETES EDUCATOR REFERRAL,         insulin aspart (NOVOLOG VIAL) 100 UNITS/ML         vial, insulin detemir (LEVEMIR VIAL) 100         UNIT/ML vial        For new diabetes equipment the referral to our RN educator is done. Call [phone] for this.   Refills are done on the meds. Monitor and record the sugars. Do the labs today and we will call the results.   The labs are ordered fasting for April and do these before the appt. See the eye doctor annually and do the daily foot care.       Alfredo Farah" MD Aram

## 2019-10-24 ENCOUNTER — TELEPHONE (OUTPATIENT)
Dept: FAMILY MEDICINE | Facility: CLINIC | Age: 24
End: 2019-10-24

## 2019-10-24 LAB
ERYTHROCYTE [DISTWIDTH] IN BLOOD BY AUTOMATED COUNT: 12.1 % (ref 10–15)
HCT VFR BLD AUTO: 46.9 % (ref 40–53)
HGB BLD-MCNC: 16.2 G/DL (ref 13.3–17.7)
MCH RBC QN AUTO: 30.5 PG (ref 26.5–33)
MCHC RBC AUTO-ENTMCNC: 34.5 G/DL (ref 31.5–36.5)
MCV RBC AUTO: 88 FL (ref 78–100)
PLATELET # BLD AUTO: 268 10E9/L (ref 150–450)
RBC # BLD AUTO: 5.31 10E12/L (ref 4.4–5.9)
WBC # BLD AUTO: 9.5 10E9/L (ref 4–11)

## 2019-10-24 NOTE — TELEPHONE ENCOUNTER
Diabetes Education Scheduling Outreach #1:    Call to patient to schedule. Left message with phone number to call to schedule.    Plan for 2nd outreach attempt within 1 week.    Ana Phillips  [facility]  Diabetes and Nutrition Scheduling

## 2019-10-30 NOTE — TELEPHONE ENCOUNTER
Diabetes Education Scheduling Outreach #2:    Call to patient to schedule. Left message with phone number to call to schedule.    Ana Phillips  [facility]  Diabetes and Nutrition Scheduling

## 2019-11-01 RX ORDER — PEN NEEDLE, DIABETIC 29 G X1/2"
NEEDLE, DISPOSABLE MISCELLANEOUS
Refills: 0 | OUTPATIENT
Start: 2019-11-01

## 2019-11-01 NOTE — TELEPHONE ENCOUNTER
"Duplicate.   Disp Refills Start End MISAEL   insulin syringe-needle U-100 (BD INSULIN SYRINGE U/F) 31G X 5/16\" 0.5 ML miscellaneous 500 each 3 10/23/2019  No   Sig: USE ONE SYRINGE 6 TIMES DAILY OR AS DIRECTED.   Sent to pharmacy as: insulin syringe-needle U-100 (BD INSULIN SYRINGE U/F) 31G X 5/16\" 0.5 ML miscellaneous   Class: E-Prescribe   Notes to Pharmacy: Pt will call to order   Order: [id number]   E-Prescribing Status: Receipt confirmed by pharmacy (10/23/2019  4:50 PM CDT)   Printout Tracking     External Result Report   Medication Administration Instructions     USE ONE SYRINGE 6 TIMES DAILY OR AS DIRECTED.   Pharmacy     [id number] [place], [place] - [address] [place] [address]       Last OV 10/23/19: Return in about 6 months (around 4/23/2020).     "

## 2019-11-01 NOTE — TELEPHONE ENCOUNTER
"Requested Prescriptions   Pending Prescriptions Disp Refills     BD INSULIN SYRINGE U/F 31G X 5/16\" 0.5 ML miscellaneous [Pharmacy Med Name: BD INS SYRNG UF 0.5 ML [id number]]  0     Sig: USE ONE SYRINGE 6 TIMES DAILY OR AS DIRECTED.       Diabetic Supplies Protocol Passed - 11/1/2019  9:52 AM        Passed - Medication is active on med list        Passed - Patient is 18 years of age or older        Passed - Recent (6 mo) or future (30 days) visit within the authorizing provider's specialty     Patient had office visit in the last 6 months or has a visit in the next 30 days with authorizing provider.  See \"Patient Info\" tab in inbasket, or \"Choose Columns\" in Meds & Orders section of the refill encounter.            Last Written Prescription Date:  10/23/19  Last Fill Quantity: 500,  # refills: 3   Last office visit: 10/23/2019 with prescribing provider:  Aram   Future Office Visit:      "

## 2020-06-08 DIAGNOSIS — E10.9 CONTROLLED TYPE 1 DIABETES MELLITUS WITHOUT COMPLICATION, WITH LONG-TERM CURRENT USE OF INSULIN (H): ICD-10-CM

## 2020-06-09 NOTE — TELEPHONE ENCOUNTER
"Requested Prescriptions   Pending Prescriptions Disp Refills     insulin aspart (NOVOLOG VIAL) 100 UNITS/ML vial [Pharmacy Med Name: NOVOLOG 100 UNIT/ML VIAL] 30 mL 3     Sig: USE 1 UNIT EVERY 10 GRAMS CARB. 1 UNIT FOR EVERY 50 POINTS OVER 150. UP TO 50 UNITS DAILY       Short Acting Insulin Protocol Failed - 6/8/2020  8:31 AM        Failed - Serum creatinine on file in past 12 months     Recent Labs   Lab Test 04/06/19  1026   CR 0.78       Ok to refill medication if creatinine is low          Failed - HgbA1C in past 3 or 6 months     If HgbA1C is 8 or greater, it needs to be on file within the past 3 months.  If less than 8, must be on file within the past 6 months.     Recent Labs   Lab Test 10/23/19  1710   A1C 7.1*             Failed - Recent (6 mo) or future (30 days) visit within the authorizing provider's specialty     Patient had office visit in the last 6 months or has a visit in the next 30 days with authorizing provider or within the authorizing provider's specialty.  See \"Patient Info\" tab in inbasket, or \"Choose Columns\" in Meds & Orders section of the refill encounter.            Passed - Medication is active on med list        Passed - Patient is age 18 or older             "

## 2020-10-12 DIAGNOSIS — E10.65 TYPE 1 DIABETES MELLITUS WITH HYPERGLYCEMIA (H): ICD-10-CM

## 2020-10-12 NOTE — TELEPHONE ENCOUNTER
"Requested Prescriptions   Pending Prescriptions Disp Refills     BD INSULIN SYRINGE U/F 31G X 5/16\" 0.5 ML miscellaneous [Pharmacy Med Name: BD INS SYRNG UF 0.5 ML [id number]]  3     Sig: USE ONE SYRINGE 6 TIMES DAILY OR AS DIRECTED.       Diabetic Supplies Protocol Failed - 10/12/2020  9:06 AM        Failed - Recent (6 mo) or future (30 days) visit within the authorizing provider's specialty     Patient had office visit in the last 6 months or has a visit in the next 30 days with authorizing provider.  See \"Patient Info\" tab in inbasket, or \"Choose Columns\" in Meds & Orders section of the refill encounter.            Passed - Medication is active on med list        Passed - Patient is 18 years of age or older             "

## 2020-10-14 RX ORDER — PEN NEEDLE, DIABETIC 29 G X1/2"
NEEDLE, DISPOSABLE MISCELLANEOUS
Qty: 200 EACH | Refills: 11 | Status: SHIPPED | OUTPATIENT
Start: 2020-10-14 | End: 2021-01-08

## 2020-10-14 NOTE — TELEPHONE ENCOUNTER
Routing refill request to provider for review/approval because:  Patient needs to be seen because:  > 6 months since last office visit.    Shasta Canales RN

## 2020-10-16 DIAGNOSIS — E10.9 CONTROLLED TYPE 1 DIABETES MELLITUS WITHOUT COMPLICATION, WITH LONG-TERM CURRENT USE OF INSULIN (H): ICD-10-CM

## 2020-10-16 RX ORDER — INSULIN DETEMIR 100 [IU]/ML
INJECTION, SOLUTION SUBCUTANEOUS
Qty: 80 ML | Refills: 3 | Status: SHIPPED | OUTPATIENT
Start: 2020-10-16 | End: 2021-01-08

## 2020-10-16 NOTE — TELEPHONE ENCOUNTER
"Requested Prescriptions   Pending Prescriptions Disp Refills     insulin detemir (LEVEMIR VIAL) 100 UNIT/ML vial [Pharmacy Med Name: LEVEMIR 100 UNIT/ML VIAL] 80 mL 3     Sig: INJECT 42 UNITS TWICE DAILY UNDER THE SKIN       Long Acting Insulin Protocol Failed - 10/16/2020 12:38 AM        Failed - Serum creatinine on file in past 12 months     Recent Labs   Lab Test 04/06/19  1026   CR 0.78       Ok to refill medication if creatinine is low          Failed - HgbA1C in past 3 or 6 months     If HgbA1C is 8 or greater, it needs to be on file within the past 3 months.  If less than 8, must be on file within the past 6 months.     Recent Labs   Lab Test 10/23/19  1710   A1C 7.1*             Failed - Recent (6 mo) or future (30 days) visit within the authorizing provider's specialty     Patient had office visit in the last 6 months or has a visit in the next 30 days with authorizing provider or within the authorizing provider's specialty.  See \"Patient Info\" tab in inbasket, or \"Choose Columns\" in Meds & Orders section of the refill encounter.            Passed - Medication is active on med list        Passed - Patient is age 18 or older             "

## 2020-11-26 ENCOUNTER — VIRTUAL VISIT (OUTPATIENT)
Dept: FAMILY MEDICINE | Facility: OTHER | Age: 25
End: 2020-11-26
Payer: COMMERCIAL

## 2020-11-26 PROCEDURE — 99421 OL DIG E/M SVC 5-10 MIN: CPT | Performed by: NURSE PRACTITIONER

## 2020-11-27 NOTE — PROGRESS NOTES
"Date: 2020 18:33:08  Clinician: Jocelyne Dickey  Clinician NPI: [id number]  Patient: Tree Conti  Patient : 1995  Patient Address: [address] [place], [place] [id number]  Patient Phone: [phone]  Visit Protocol: URI  Patient Summary:  Tree is a 25 year old ( : 1995 ) male who initiated a [facility] Visit for COVID-19 (Coronavirus) evaluation and screening. When asked the question \"Please sign me up to receive news, health information and promotions from [facility].\", Tree responded \"No\".    Tree states his symptoms started gradually 7-9 days ago. After his symptoms started, they improved and then got worse again.   His symptoms consist of ear pain, a headache, a cough, myalgia, chills, malaise, a sore throat, and diarrhea. Tree also feels feverish but was unable to measure his temperature.   Symptom details     Cough: Tree coughs a few times an hour and his cough is more bothersome at night. Phlegm comes into his throat when he coughs. He believes his cough is caused by post-nasal drip. The color of the phlegm is blood-tinged, white, clear, and yellow.     Sore throat: Tree reports having mild throat pain (1-3 on a 10 point pain scale), does not have exudate on his tonsils, and can swallow liquids. He is not sure if the lymph nodes in his neck are enlarged. A rash has not appeared on the skin since the sore throat started.     Headache: He states the headache is moderate (4-6 on a 10 point pain scale).      Tree denies having wheezing, nasal congestion, nausea, vomiting, rhinitis, facial pain or pressure, teeth pain, ageusia, and anosmia. He also denies taking antibiotic medication in the past month and having recent facial or sinus surgery in the past 60 days. He is not experiencing dyspnea.   Precipitating events  Within the past week, Tree has not been exposed to someone with strep throat. He has not recently been exposed to someone with influenza. Tree has not been in close contact with " any high risk individuals.   Pertinent COVID-19 (Coronavirus) information  Tree does not work or volunteer as healthcare worker or a . In the past 14 days, Tree has not worked or volunteered at a healthcare facility or group living setting.   In the past 14 days, he also has not lived in a congregate living setting.   Tree has had a close contact with a laboratory-confirmed COVID-19 patient within 14 days of symptom onset. He was exposed at his work. Date Tree was exposed to the laboratory-confirmed COVID-19 patient: 11/17/2020   Additional information about contact with COVID-19 (Coronavirus) patient as reported by the patient (free text): My Mother at our home    Since December 2019, Tree has been tested for COVID-19 and has had upper respiratory infection (URI) or influenza-like illness.      Result of COVID-19 test: Negative     Date of his COVID-19 test: 11/21/2020     Date(s) of previous URI or influenza-like illness (free-text): January or February of 2020     Symptoms Tree experienced during previous URI or influenza-like illness as reported by the patient (free-text): Bad cough and body aches and chills. Etc.        Pertinent medical history  Tree has diabetes. He has been told by his provider that his diabetes is in control.   Tree had 1 sinus infection within the past year.   He has not been told by his provider to avoid NSAIDs.   He denies having immunosuppressive conditions (e.g., chemotherapy, HIV, organ transplant, long-term use of steroids or other immunosuppressive medications, splenectomy). He does not have severe COPD and congestive heart failure. He does not have asthma.   Tree does not need a return to work/school note.   Weight: 220 lbs   Tree smokes or uses smokeless tobacco.   Weight: 220 lbs    MEDICATIONS: Levemir U-100 Insulin subcutaneous, Novolog U-100 Insulin aspart subcutaneous, ALLERGIES: NKDA  Clinician Response:  Dear Tree,         Your symptoms show that  "you may have coronavirus (COVID-19). This&nbsp;illness can cause fever, cough and trouble breathing. Many people get a mild case and get better on their own. Some people can get very sick.  What should I do?  We would like to test you for this virus.  1. Please call [phone] to schedule your visit. Explain that you were referred by [facility] to have a COVID-19 test. Be ready to share your [facility] visit ID number. Do not schedule your appointment until you have had at least 2 days of symptoms or you may receive a false negative result.  The following will serve as your written order for this COVID Test, ordered by me, for the indication of suspected COVID [Z20.828]: The test will be ordered in [facility], our electronic health record, after you are scheduled. It will show as ordered and authorized by Rodolfo Phillips MD.  Order: COVID-19 (Coronavirus) PCR for SYMPTOMATIC testing from [facility].    2. When it's time for your COVID test:  Stay at least 6 feet away from others. (If someone will drive you to your test, stay in the backseat, as far away from the  as you can.)  Cover your mouth and nose with a mask, tissue or washcloth.  Go straight to the testing site. Don't make any stops on the way there or back.    3.Starting now:&nbsp;Stay home and away from others (self-isolate) until:   You've had&nbsp;no&nbsp;fever---and no medicine that reduces fever---for one full day (24 hours).&nbsp;And...  Your other symptoms have gotten better. For example, your cough or breathing has improved.&nbsp;And...  At least&nbsp;10 days&nbsp;have passed since your symptoms started.    During this time, don't leave the house except for testing or medical care.   Stay in your own room, even for meals. Use your own bathroom if you can.  Stay away from others in your home. No hugging, kissing or shaking hands. No visitors.  Don't go to work, school or anywhere else.   Clean \"high touch\" surfaces often (doorknobs, counters, handles, etc.). Use a " household cleaning spray or wipes. You'll find a full list of  on the EPA website:&nbsp;www.epa.gov/pesticide-registration/list-n-disinfectants-use-against-sars-cov-2.   Cover your mouth and nose with a mask, tissue or washcloth to avoid spreading germs.  Wash your hands and face often. Use soap and water.  Caregivers in these groups are at risk for severe illness due to COVID-19:  o People 65 years and older  o People who live in a nursing home or long-term care facility  o People with chronic disease (lung, heart, cancer, diabetes, kidney, liver, immunologic)  o People who have a weakened immune system, including those who:   Are in cancer treatment  Take medicine that weakens the immune system, such as corticosteroids  Had a bone marrow or organ transplant  Have an immune deficiency  Have poorly controlled HIV or AIDS  Are obese (body mass index of 40 or higher)  Smoke regularly   o Caregivers should wear gloves while washing dishes, handling laundry and cleaning bedrooms and bathrooms.  o Use caution when washing and drying laundry: Don't shake dirty laundry, and use the warmest water setting that you can.  o For more tips, go to&nbsp;www.cdc.gov/coronavirus/2019-ncov/downloads/10Things.pdf.   How can I take care of myself?    Get lots of rest. Drink extra fluids&nbsp;(unless a doctor has told you not to).  Take Tylenol (acetaminophen) for fever or pain.&nbsp;If you have liver or kidney problems, ask your family doctor if it's okay to take Tylenol.   Adults can take either:   650 mg (two 325 mg pills) every 4 to 6 hours,&nbsp;or...  1,000 mg (two 500 mg pills) every 8 hours as needed.  Note:&nbsp;Don't take more than 3,000 mg in one day. Acetaminophen is found in many medicines (both prescribed and over-the-counter medicines). Read all labels to be sure you don't take too much.   For children, check the Tylenol bottle for the right dose. The dose is based on the child's age or weight.   If you have other  health problems (like cancer, heart failure, an organ transplant or severe kidney disease):&nbsp;Call your specialty clinic if you don't feel better in the next 2 days.    Know when to call 911.&nbsp;Emergency warning signs include:   Trouble breathing or shortness of breath Pain or pressure in the chest that doesn't go away Feeling confused like you haven't felt before, or not being able to wake up Bluish-colored lips or face.  Where can I get more information?   [facility] -- About COVID-19:&nbsp;[email or web address]  CDC -- What to Do If You're Sick:&nbsp;www.cdc.gov/coronavirus/2019-ncov/about/steps-when-sick.html  [facility] -- Ending Home Isolation:&nbsp;www.cdc.gov/coronavirus/2019-ncov/hcp/disposition-in-home-patients.html  [facility] -- Caring for Someone:&nbsp;www.cdc.gov/coronavirus/2019-ncov/if-you-are-sick/care-for-someone.html  [facility] -- Interim Guidance for Hospital Discharge to Home:&nbsp;[email or web address]/[email or web address]/[email or web address]/hcp/hospdischarge.pdf  [facility] clinical trials (COVID-19 research studies):&nbsp;clinicalaffairs.[email or web address]/umn-clinical-trials  Below are the COVID-19 hotlines at the [facility] ([facility]). Interpreters are available.   For health questions: Call 981-018-3189 or 1-968.284.7147 (7 a.m. to 7 p.m.) For questions about schools and childcare: Call 612-019-5661 or 1-972.470.6138 (7 a.m. to 7 p.m.)           Diagnosis: Contact with and (suspected) exposure to other viral communicable diseases  Diagnosis ICD: Z20.828

## 2020-11-28 DIAGNOSIS — Z20.822 SUSPECTED COVID-19 VIRUS INFECTION: ICD-10-CM

## 2020-11-28 DIAGNOSIS — Z20.822 SUSPECTED COVID-19 VIRUS INFECTION: Primary | ICD-10-CM

## 2020-11-28 PROCEDURE — U0003 INFECTIOUS AGENT DETECTION BY NUCLEIC ACID (DNA OR RNA); SEVERE ACUTE RESPIRATORY SYNDROME CORONAVIRUS 2 (SARS-COV-2) (CORONAVIRUS DISEASE [COVID-19]), AMPLIFIED PROBE TECHNIQUE, MAKING USE OF HIGH THROUGHPUT TECHNOLOGIES AS DESCRIBED BY CMS-2020-01-R: HCPCS | Performed by: NURSE PRACTITIONER

## 2020-11-30 LAB
SARS-COV-2 RNA SPEC QL NAA+PROBE: ABNORMAL
SPECIMEN SOURCE: ABNORMAL

## 2020-12-24 ENCOUNTER — MYC MEDICAL ADVICE (OUTPATIENT)
Dept: FAMILY MEDICINE | Facility: CLINIC | Age: 25
End: 2020-12-24

## 2020-12-24 DIAGNOSIS — E10.9 CONTROLLED TYPE 1 DIABETES MELLITUS WITHOUT COMPLICATION, WITH LONG-TERM CURRENT USE OF INSULIN (H): ICD-10-CM

## 2020-12-24 DIAGNOSIS — I10 ESSENTIAL HYPERTENSION: Primary | ICD-10-CM

## 2020-12-24 DIAGNOSIS — E78.5 HYPERLIPIDEMIA LDL GOAL <100: ICD-10-CM

## 2020-12-24 NOTE — TELEPHONE ENCOUNTER
"Requested Prescriptions   Pending Prescriptions Disp Refills     insulin aspart (NOVOLOG VIAL) 100 UNITS/ML vial 30 mL 3     Sig: USE 1 UNIT EVERY 10 GRAMS CARB. 1 UNIT FOR EVERY 50 POINTS OVER 150. UP TO 50 UNITS DAILY       Short Acting Insulin Protocol Failed - 12/24/2020 11:19 AM        Failed - Serum creatinine on file in past 12 months     Recent Labs   Lab Test 04/06/19  1026   CR 0.78       Ok to refill medication if creatinine is low          Failed - HgbA1C in past 3 or 6 months     If HgbA1C is 8 or greater, it needs to be on file within the past 3 months.  If less than 8, must be on file within the past 6 months.     Recent Labs   Lab Test 10/23/19  1710   A1C 7.1*             Failed - Recent (6 mo) or future (30 days) visit within the authorizing provider's specialty     Patient had office visit in the last 6 months or has a visit in the next 30 days with authorizing provider or within the authorizing provider's specialty.  See \"Patient Info\" tab in inbasket, or \"Choose Columns\" in Meds & Orders section of the refill encounter.            Passed - Medication is active on med list        Passed - Patient is age 18 or older           Routing refill request to provider for review/approval because:  Fatou given x1 and patient did not follow up, please advise    Advised patient through [facility] he is overdue for Office visit and labs.     Pended labs for provider.         "

## 2021-01-08 ENCOUNTER — TELEPHONE (OUTPATIENT)
Dept: FAMILY MEDICINE | Facility: CLINIC | Age: 26
End: 2021-01-08

## 2021-01-08 ENCOUNTER — OFFICE VISIT (OUTPATIENT)
Dept: FAMILY MEDICINE | Facility: CLINIC | Age: 26
End: 2021-01-08
Payer: COMMERCIAL

## 2021-01-08 VITALS
DIASTOLIC BLOOD PRESSURE: 80 MMHG | BODY MASS INDEX: 35.8 KG/M2 | SYSTOLIC BLOOD PRESSURE: 120 MMHG | HEIGHT: 68 IN | TEMPERATURE: 98.7 F | OXYGEN SATURATION: 96 % | HEART RATE: 96 BPM | WEIGHT: 236.2 LBS

## 2021-01-08 DIAGNOSIS — E78.5 HYPERLIPIDEMIA LDL GOAL <100: ICD-10-CM

## 2021-01-08 DIAGNOSIS — I47.10 SVT (SUPRAVENTRICULAR TACHYCARDIA) (H): ICD-10-CM

## 2021-01-08 DIAGNOSIS — E10.9 CONTROLLED TYPE 1 DIABETES MELLITUS WITHOUT COMPLICATION, WITH LONG-TERM CURRENT USE OF INSULIN (H): ICD-10-CM

## 2021-01-08 DIAGNOSIS — I10 ESSENTIAL HYPERTENSION: ICD-10-CM

## 2021-01-08 LAB
ANION GAP SERPL CALCULATED.3IONS-SCNC: 7 MMOL/L (ref 3–14)
BUN SERPL-MCNC: 16 MG/DL (ref 7–30)
CALCIUM SERPL-MCNC: 8.6 MG/DL (ref 8.5–10.1)
CHLORIDE SERPL-SCNC: 107 MMOL/L (ref 94–109)
CHOLEST SERPL-MCNC: 200 MG/DL
CO2 SERPL-SCNC: 21 MMOL/L (ref 20–32)
CREAT SERPL-MCNC: 0.74 MG/DL (ref 0.66–1.25)
CREAT UR-MCNC: 87 MG/DL
GFR SERPL CREATININE-BSD FRML MDRD: >90 ML/MIN/{1.73_M2}
GLUCOSE SERPL-MCNC: 158 MG/DL (ref 70–99)
HBA1C MFR BLD: 6.7 % (ref 0–5.6)
HDLC SERPL-MCNC: 46 MG/DL
LDLC SERPL CALC-MCNC: 134 MG/DL
MICROALBUMIN UR-MCNC: <5 MG/L
MICROALBUMIN/CREAT UR: NORMAL MG/G CR (ref 0–17)
NONHDLC SERPL-MCNC: 154 MG/DL
POTASSIUM SERPL-SCNC: 4.5 MMOL/L (ref 3.4–5.3)
SODIUM SERPL-SCNC: 135 MMOL/L (ref 133–144)
TRIGL SERPL-MCNC: 98 MG/DL

## 2021-01-08 PROCEDURE — 80061 LIPID PANEL: CPT | Performed by: FAMILY MEDICINE

## 2021-01-08 PROCEDURE — 83036 HEMOGLOBIN GLYCOSYLATED A1C: CPT | Performed by: FAMILY MEDICINE

## 2021-01-08 PROCEDURE — 82043 UR ALBUMIN QUANTITATIVE: CPT | Performed by: FAMILY MEDICINE

## 2021-01-08 PROCEDURE — 80048 BASIC METABOLIC PNL TOTAL CA: CPT | Performed by: FAMILY MEDICINE

## 2021-01-08 PROCEDURE — 36415 COLL VENOUS BLD VENIPUNCTURE: CPT | Performed by: FAMILY MEDICINE

## 2021-01-08 PROCEDURE — 99214 OFFICE O/P EST MOD 30 MIN: CPT | Performed by: FAMILY MEDICINE

## 2021-01-08 RX ORDER — SYRINGE-NEEDLE,INSULIN,0.5 ML 27GX1/2"
SYRINGE, EMPTY DISPOSABLE MISCELLANEOUS
Qty: 600 EACH | Refills: 3 | Status: SHIPPED | OUTPATIENT
Start: 2021-01-08 | End: 2022-02-16

## 2021-01-08 RX ORDER — INSULIN DETEMIR 100 [IU]/ML
43 INJECTION, SOLUTION SUBCUTANEOUS 2 TIMES DAILY
Qty: 80 ML | Refills: 3 | Status: SHIPPED | OUTPATIENT
Start: 2021-01-08 | End: 2021-01-11

## 2021-01-08 ASSESSMENT — MIFFLIN-ST. JEOR: SCORE: 2024.87

## 2021-01-08 NOTE — PROGRESS NOTES
SUBJECTIVE:                                                    Tree Conti is 25 year old male   Chief Complaint   Patient presents with     Diabetes       25 yr old male here for recheck of his diabetes. He has Type I diabetes. A1C today is 6.7 which is an improvement from his last A1C.  Patient denies any issues with taking his insulin. Patient also needs his test strips renewed.       Diabetes Follow-up    How often are you checking your blood sugar? Four or more times daily  Blood sugar testing frequency justification:  Adjustment of medication(s)  What time of day are you checking your blood sugars (select all that apply)?  all times  Have you had any blood sugars above 200?  Yes occasionally, up to 400  Have you had any blood sugars below 70?  Yes occasionally. Last night was the latest    What symptoms do you notice when your blood sugar is low?  Shaky, Blurred vision and Confusion    What concerns do you have today about your diabetes? None     Do you have any of these symptoms? (Select all that apply)  No numbness or tingling in feet.  No redness, sores or blisters on feet.  No complaints of excessive thirst.  No reports of blurry vision.  No significant changes to weight.    Have you had a diabetic eye exam in the last 12 months? No, will schedule        BP Readings from Last 2 Encounters:   01/08/21 120/80   10/23/19 (!) 130/92     Hemoglobin A1C (%)   Date Value   01/08/2021 6.7 (H)   10/23/2019 7.1 (H)     LDL Cholesterol Calculated (mg/dL)   Date Value   04/06/2019 120 (H)   07/27/2018 131 (H)                 How many servings of fruits and vegetables do you eat daily?  2-3    On average, how many sweetened beverages do you drink each day (Examples: soda, juice, sweet tea, etc.  Do NOT count diet or artificially sweetened beverages)?   0    How many days per week do you exercise enough to make your heart beat faster? 3 or less    How many minutes a day do you exercise enough to make your heart  "beat faster? 9 or less    How many days per week do you miss taking your medication? 0      Problem list and histories reviewed & adjusted, as indicated.  Additional history: as documented    Patient Active Problem List   Diagnosis     Celiac disease     CARDIOVASCULAR SCREENING; LDL GOAL LESS THAN 100     Tobacco abuse     SVT (supraventricular tachycardia) (H)     Controlled type 1 diabetes mellitus without complication, with long-term current use of insulin (H)     Essential hypertension     Hyperlipidemia LDL goal <100     Pigmented skin lesions     Past Surgical History:   Procedure Laterality Date     EP ABLATION / EP STUDIES  08/18/2016    AVRT ablation     HC TOOTH EXTRACTION W/FORCEP      april       Social History     Tobacco Use     Smoking status: Current Every Day Smoker     Packs/day: 0.25     Types: Cigarettes     Smokeless tobacco: Former User     Tobacco comment: 1/4 to 1/2 ppd.   Substance Use Topics     Alcohol use: Not Currently     Alcohol/week: 0.0 standard drinks     Comment: Not currently drinking.  Was at 4-5 times per week, 5-6 drinks per time.- HAS NOT DRANK IN 3 months     Family History   Problem Relation Age of Onset     Heart Disease Mother          Current Outpatient Medications   Medication Sig Dispense Refill     insulin aspart (NOVOLOG VIAL) 100 UNITS/ML vial USE 1 UNIT EVERY 10 GRAMS CARB. 1 UNIT FOR EVERY 50 POINTS OVER 150. UP TO 50 UNITS DAILY doing 33U per 100 30 mL 3     insulin detemir (LEVEMIR VIAL) 100 UNIT/ML vial Inject 43 Units Subcutaneous 2 times daily INJECT 42 UNITS TWICE DAILY UNDER THE SKIN 80 mL 3     insulin syringe-needle U-100 (BD INSULIN SYRINGE U/F) 31G X 5/16\" 0.5 ML miscellaneous USE ONE SYRINGE 6 TIMES DAILY OR AS DIRECTED. 600 each 3     STATIN NOT PRESCRIBED, INTENTIONAL, Statin not prescribed intentionally due to Not indicated based on age 0 each 0     Allergies   Allergen Reactions     Gluten Other (See Comments)     No Wheat or Gluten- Pt has celiac " "    Recent Labs   Lab Test 01/08/21  0837 10/23/19  1710 04/06/19  1026 07/27/18  1540 05/10/18  1550 12/08/17  1455 05/20/16  1554 05/20/16  1554   A1C 6.7* 7.1* 6.7* 7.0*  --  7.2*   < > 8.6*   LDL  --   --  120* 131*  --  105*   < > 125*   HDL  --   --  41 36*  --  36*   < > 40   TRIG  --   --  68 96  --  108   < > 120   ALT  --   --   --   --  29 51  --  22   CR  --   --  0.78  --  0.84 0.77   < > 0.82   GFRESTIMATED  --   --  >90  --  >90 >90   < > >90  Non  GFR Calc     GFRESTBLACK  --   --  >90  --  >90 >90   < > >90  African American GFR Calc     POTASSIUM  --   --  4.1  --  4.6  --    < >  --    TSH  --  0.99  --   --   --  0.86  --  0.94    < > = values in this interval not displayed.      BP Readings from Last 3 Encounters:   01/08/21 120/80   10/23/19 (!) 130/92   04/15/19 138/84    Wt Readings from Last 3 Encounters:   01/08/21 107.1 kg (236 lb 3.2 oz)   10/23/19 97.1 kg (214 lb)   04/15/19 97.5 kg (215 lb)         ROS:  Constitutional, HEENT, cardiovascular, pulmonary, gi and gu systems are negative, except as otherwise noted.    OBJECTIVE:                                                    /80   Pulse 96   Temp 98.7  F (37.1  C) (Tympanic)   Ht 1.734 m (5' 8.25\")   Wt 107.1 kg (236 lb 3.2 oz)   SpO2 96%   BMI 35.65 kg/m    GENERAL APPEARANCE ADULT: Alert, no acute distress  EYES: PERRL, EOM normal, conjunctiva and lids normal  HENT: Ears and TMs normal, oral mucosa and posterior oropharynx normal  NECK: No adenopathy,masses or thyromegaly  RESP: lungs clear to auscultation   ABDOMEN: soft, no organomegaly, masses or tenderness  MS: extremities normal, no peripheral edema  foot exam normal DP and PT pulses, no trophic changes or ulcerative lesions and normal monofilament exam  SKIN: no suspicious lesions or rashes  PSYCH: mentation appears normal., affect and mood normal  Diagnostic Test Results:  Results for orders placed or performed in visit on 01/08/21 (from the past 24 " hour(s))   **A1C FUTURE anytime   Result Value Ref Range    Hemoglobin A1C 6.7 (H) 0 - 5.6 %        ASSESSMENT/PLAN:                                                    1. Controlled type 1 diabetes mellitus without complication, with long-term current use of insulin (H)  A1C has improved form last check.medication renewed.  - Albumin Random Urine Quantitative with Creat Ratio  - **A1C FUTURE anytime  - insulin detemir (LEVEMIR VIAL) 100 UNIT/ML vial; Inject 43 Units Subcutaneous 2 times daily INJECT 42 UNITS TWICE DAILY UNDER THE SKIN  Dispense: 80 mL; Refill: 3  - insulin aspart (NOVOLOG VIAL) 100 UNITS/ML vial; USE 1 UNIT EVERY 10 GRAMS CARB. 1 UNIT FOR EVERY 50 POINTS OVER 150. UP TO 50 UNITS DAILY doing 33U per 100  Dispense: 30 mL; Refill: 3    2. Essential hypertension  - Basic metabolic panel FUTURE anytime    3. Hyperlipidemia LDL goal <100  Patient will be notified of results  - Lipid panel reflex to direct LDL Fasting    4. SVT (supraventricular tachycardia) (H)        Radha Roque MD  [facility]

## 2021-01-11 RX ORDER — INSULIN DETEMIR 100 [IU]/ML
43 INJECTION, SOLUTION SUBCUTANEOUS 2 TIMES DAILY
Qty: 80 ML | Refills: 3 | COMMUNITY
Start: 2021-01-11 | End: 2022-02-11

## 2021-01-11 NOTE — TELEPHONE ENCOUNTER
Pharmacy was notified.  The med list was updated with the correct sig.    Thank you    Elaine JAMES RN

## 2021-01-11 NOTE — TELEPHONE ENCOUNTER
Dr. Roque saw him on 1-8 and note had the two different doses. She needs to clarify this and then correct it in the chart. .    Alfredo Chiu MD

## 2021-01-13 ENCOUNTER — MYC MEDICAL ADVICE (OUTPATIENT)
Dept: FAMILY MEDICINE | Facility: CLINIC | Age: 26
End: 2021-01-13

## 2021-01-14 NOTE — TELEPHONE ENCOUNTER
Tree, I am sorry. I ordered the labs to the results came to me. I did not known you saw Dr. Roque on 1-8-21. You do not need another appt now.     Alfredo Chiu MD      (Please forward this to Tree).

## 2021-01-15 ENCOUNTER — HEALTH MAINTENANCE LETTER (OUTPATIENT)
Age: 26
End: 2021-01-15

## 2021-04-05 ENCOUNTER — TRANSFERRED RECORDS (OUTPATIENT)
Dept: HEALTH INFORMATION MANAGEMENT | Facility: CLINIC | Age: 26
End: 2021-04-05

## 2021-04-05 LAB — RETINOPATHY: NEGATIVE

## 2021-07-24 ENCOUNTER — HEALTH MAINTENANCE LETTER (OUTPATIENT)
Age: 26
End: 2021-07-24

## 2021-09-18 ENCOUNTER — HEALTH MAINTENANCE LETTER (OUTPATIENT)
Age: 26
End: 2021-09-18

## 2021-11-30 ENCOUNTER — MYC MEDICAL ADVICE (OUTPATIENT)
Dept: FAMILY MEDICINE | Facility: CLINIC | Age: 26
End: 2021-11-30
Payer: COMMERCIAL

## 2022-02-01 DIAGNOSIS — E10.9 CONTROLLED TYPE 1 DIABETES MELLITUS WITHOUT COMPLICATION, WITH LONG-TERM CURRENT USE OF INSULIN (H): ICD-10-CM

## 2022-02-02 NOTE — TELEPHONE ENCOUNTER
Routing refill request to provider for review/approval because:  Labs not current:  diabetic  Patient needs to be seen because:  diabetic

## 2022-02-11 DIAGNOSIS — E10.9 CONTROLLED TYPE 1 DIABETES MELLITUS WITHOUT COMPLICATION, WITH LONG-TERM CURRENT USE OF INSULIN (H): ICD-10-CM

## 2022-02-11 RX ORDER — INSULIN DETEMIR 100 [IU]/ML
43 INJECTION, SOLUTION SUBCUTANEOUS 2 TIMES DAILY
Qty: 80 ML | Refills: 3 | Status: CANCELLED | OUTPATIENT
Start: 2022-02-11

## 2022-02-11 NOTE — TELEPHONE ENCOUNTER
insulin detemir (LEVEMIR VIAL) 100 UNIT/ML vial      Per [facility] pharmacy Pt. Has always been on the vials, can we get a new order for these ASAP. Thank You!

## 2022-02-15 ENCOUNTER — MYC MEDICAL ADVICE (OUTPATIENT)
Dept: FAMILY MEDICINE | Facility: CLINIC | Age: 27
End: 2022-02-15
Payer: COMMERCIAL

## 2022-02-15 DIAGNOSIS — E10.9 CONTROLLED TYPE 1 DIABETES MELLITUS WITHOUT COMPLICATION, WITH LONG-TERM CURRENT USE OF INSULIN (H): ICD-10-CM

## 2022-02-16 RX ORDER — SYRINGE-NEEDLE,INSULIN,0.5 ML 27GX1/2"
SYRINGE, EMPTY DISPOSABLE MISCELLANEOUS
Qty: 600 EACH | Refills: 1 | Status: SHIPPED | OUTPATIENT
Start: 2022-02-16 | End: 2022-10-07

## 2022-02-16 RX ORDER — IBUPROFEN 200 MG
1 TABLET ORAL DAILY
Qty: 200 EACH | Refills: 3 | Status: SHIPPED | OUTPATIENT
Start: 2022-02-16 | End: 2022-11-11

## 2022-02-16 NOTE — TELEPHONE ENCOUNTER
Dr Roque    [facility]    Pt requesting insulin just refilled to be changed to vials and not pens    novolog and nette Andrew RN

## 2022-02-16 NOTE — TELEPHONE ENCOUNTER
Dr. Roque,    Per my chart the patient is asking for the Levemir to be in vial form as well, can this be changed? Syringe refilled.    RAMNÓ Durham

## 2022-02-27 ENCOUNTER — HEALTH MAINTENANCE LETTER (OUTPATIENT)
Age: 27
End: 2022-02-27

## 2022-03-25 ENCOUNTER — TELEPHONE (OUTPATIENT)
Dept: FAMILY MEDICINE | Facility: CLINIC | Age: 27
End: 2022-03-25
Payer: COMMERCIAL

## 2022-03-25 NOTE — TELEPHONE ENCOUNTER
Reason for Call:  Form, our goal is to have forms completed with 72 hours, however, some forms may require a visit or additional information.    Type of letter, form or note:  employer    Who is the form from?: Patient    Where did the form come from: Patient or family brought in       What clinic location was the form placed at?: [facility]     Where the form was placed:     What number is listed as a contact on the form?: [phone]       Additional comments: needs to be completed before 4/8/22    Call taken on 3/25/2022 at 3:42 PM by Aleisha Bennett

## 2022-03-27 ENCOUNTER — MYC MEDICAL ADVICE (OUTPATIENT)
Dept: ADMISSION | Facility: CLINIC | Age: 27
End: 2022-03-27
Payer: COMMERCIAL

## 2022-03-27 NOTE — TELEPHONE ENCOUNTER
FMLA (type 1 diabetic/celiac disease) form started and My Chart note sent to patient to clarify needs of form.

## 2022-04-05 NOTE — TELEPHONE ENCOUNTER
Called patient he is going to come  the forms I sent copy to scanning and placed in file cabinet.  Carly Cannon PSC on 4/5/2022 at 11:28 AM

## 2022-10-04 DIAGNOSIS — E10.9 CONTROLLED TYPE 1 DIABETES MELLITUS WITHOUT COMPLICATION, WITH LONG-TERM CURRENT USE OF INSULIN (H): ICD-10-CM

## 2022-10-05 NOTE — TELEPHONE ENCOUNTER
"Requested Prescriptions   Pending Prescriptions Disp Refills    BD INSULIN SYRINGE U/F 31G X 5/16\" 0.5 ML miscellaneous [Pharmacy Med Name: BD INS SYRNG UF 0.5 ML [id number]] 600 each 1     Sig: USE ONE SYRINGE 6 TIMES DAILY OR AS DIRECTED.        Diabetic Supplies Protocol Failed - 10/4/2022  3:53 PM        Failed - Recent (6 mo) or future (30 days) visit within the authorizing provider's specialty     Patient had office visit in the last 6 months or has a visit in the next 30 days with authorizing provider.  See \"Patient Info\" tab in inbasket, or \"Choose Columns\" in Meds & Orders section of the refill encounter.            Passed - Medication is active on med list        Passed - Patient is 18 years of age or older              "

## 2022-10-07 RX ORDER — PEN NEEDLE, DIABETIC 29 G X1/2"
NEEDLE, DISPOSABLE MISCELLANEOUS
Qty: 600 EACH | Refills: 1 | Status: SHIPPED | OUTPATIENT
Start: 2022-10-07 | End: 2022-11-11

## 2022-10-13 ENCOUNTER — MYC MEDICAL ADVICE (OUTPATIENT)
Dept: FAMILY MEDICINE | Facility: CLINIC | Age: 27
End: 2022-10-13

## 2022-10-13 DIAGNOSIS — E10.9 CONTROLLED TYPE 1 DIABETES MELLITUS WITHOUT COMPLICATION, WITH LONG-TERM CURRENT USE OF INSULIN (H): ICD-10-CM

## 2022-10-14 DIAGNOSIS — E10.9 CONTROLLED TYPE 1 DIABETES MELLITUS WITHOUT COMPLICATION, WITH LONG-TERM CURRENT USE OF INSULIN (H): ICD-10-CM

## 2022-10-14 RX ORDER — INSULIN ASPART 100 [IU]/ML
INJECTION, SOLUTION INTRAVENOUS; SUBCUTANEOUS
Qty: 300 ML | Refills: 1 | Status: SHIPPED | OUTPATIENT
Start: 2022-10-14 | End: 2022-11-11

## 2022-10-14 NOTE — TELEPHONE ENCOUNTER
Forwarding Sita request for insulin refills to provider, as patient is much overdue for a visit-last was 1/2021.  Robleshart sent advising he needs a visit, but unsure if he will follow-through.   Insulin detemir pended for consideration, but unable to pend insulin aspart vial.     Gabriela Pittman RN  [facility]

## 2022-10-18 NOTE — TELEPHONE ENCOUNTER
RN notes that provider did give another jack refill and advised appointment.  MyChart sent to patient.     Gabriela Pittman RN  [facility]

## 2022-10-28 ENCOUNTER — OFFICE VISIT (OUTPATIENT)
Dept: URGENT CARE | Facility: URGENT CARE | Age: 27
End: 2022-10-28
Payer: COMMERCIAL

## 2022-10-28 VITALS
RESPIRATION RATE: 24 BRPM | OXYGEN SATURATION: 98 % | DIASTOLIC BLOOD PRESSURE: 80 MMHG | BODY MASS INDEX: 34.4 KG/M2 | TEMPERATURE: 99.7 F | HEIGHT: 68 IN | HEART RATE: 116 BPM | WEIGHT: 227 LBS | SYSTOLIC BLOOD PRESSURE: 148 MMHG

## 2022-10-28 DIAGNOSIS — K61.1 PERIRECTAL ABSCESS: Primary | ICD-10-CM

## 2022-10-28 DIAGNOSIS — L03.317 CELLULITIS OF BUTTOCK: ICD-10-CM

## 2022-10-28 DIAGNOSIS — E10.9 CONTROLLED TYPE 1 DIABETES MELLITUS WITHOUT COMPLICATION, WITH LONG-TERM CURRENT USE OF INSULIN (H): ICD-10-CM

## 2022-10-28 DIAGNOSIS — E10.65 TYPE 1 DIABETES MELLITUS WITH HYPERGLYCEMIA (H): ICD-10-CM

## 2022-10-28 PROCEDURE — 99214 OFFICE O/P EST MOD 30 MIN: CPT | Mod: 25 | Performed by: STUDENT IN AN ORGANIZED HEALTH CARE EDUCATION/TRAINING PROGRAM

## 2022-10-28 PROCEDURE — 96372 THER/PROPH/DIAG INJ SC/IM: CPT | Mod: 59 | Performed by: STUDENT IN AN ORGANIZED HEALTH CARE EDUCATION/TRAINING PROGRAM

## 2022-10-28 PROCEDURE — 10061 I&D ABSCESS COMP/MULTIPLE: CPT | Performed by: STUDENT IN AN ORGANIZED HEALTH CARE EDUCATION/TRAINING PROGRAM

## 2022-10-28 RX ORDER — CEFTRIAXONE SODIUM 1 G
1 VIAL (EA) INJECTION ONCE
Status: COMPLETED | OUTPATIENT
Start: 2022-10-28 | End: 2022-10-28

## 2022-10-28 RX ADMIN — Medication 1 G: at 20:19

## 2022-10-28 ASSESSMENT — PAIN SCALES - GENERAL: PAINLEVEL: SEVERE PAIN (6)

## 2022-10-28 NOTE — PROGRESS NOTES
Assessment & Plan     Perirectal abscess  Cellulitis of buttock  Patient presents with perineal abscess. I cleansed the area with alcohol swabs and injected 4 ml of lidocaine with epi for anesthesia. Swabbed the entire area with betadine and proceeded to do stab incision with #11 scalpel and was able to get moderate amt of serousanuinous drainage and otherwise bloody drainage in other 2 spots where I did stab incision overlying the areas of fluctuance. Covered area with bacitracin, nonadherent dressing and secured with tape. Advised to keep area covered particularly when have BM. Monitor for fevers, chills, increased erythema, pain, etc and go to ER if this occurs. He is high risk for worsening of infection due to type 1 diabetes so I gave an injection of Rocephin to expedite treatment of cellulitis surrounding the abscess and start oral Augmentin tomorrow. Advised to eat yogurt or take probiotic for 2 weeks to restore healthy gut bacteria. Follow up next week with primary care or UC to recheck. General surgery referral to consult on possible surgical removal of cyst sac. Patient verbalized understanding of plan.   - amoxicillin-clavulanate (AUGMENTIN) 875-125 MG tablet  Dispense: 20 tablet; Refill: 0  - Adult General Surg Referral  - cefTRIAXone (ROCEPHIN) in lidocaine 1% (PF) injection 1 g    Controlled type 1 diabetes mellitus without complication, with long-term current use of insulin (H)  Type 1 diabetes mellitus with hyperglycemia (H)  Last A1c 6.7, blood sugars the past 2 days have been higher but able to control them with insulin dose adjustments.   See also above notes  - cefTRIAXone (ROCEPHIN) in lidocaine 1% (PF) injection 1 g       50 minutes spent on the date of the encounter doing chart review, patient visit and documentation       No follow-ups on file.    LAILA Moore [facility]    Rashel Leavitt is a 26 year old male who presents to clinic  "today for the following health issues:  Chief Complaint   Patient presents with     Groin Swelling     Cyst in groin area x 5 days - has tried to drain/ is very painful     HPI    History of type 1 DIABETES. Blood sugars the past 2 days have been higher but able to control them with insulin dose adjustments. Has felt a little chilled at times.     Patient Active Problem List   Diagnosis     Celiac disease     CARDIOVASCULAR SCREENING; LDL GOAL LESS THAN 100     Tobacco abuse     SVT (supraventricular tachycardia) (H)     Controlled type 1 diabetes mellitus without complication, with long-term current use of insulin (H)     Essential hypertension     Hyperlipidemia LDL goal <100     Pigmented skin lesions     Type 1 diabetes mellitus with hyperglycemia (H)     Current Outpatient Medications   Medication     amoxicillin-clavulanate (AUGMENTIN) 875-125 MG tablet     BD INSULIN SYRINGE U/F 31G X 5/16\" 0.5 ML miscellaneous     insulin aspart (NOVOLOG VIAL) 100 UNITS/ML vial     insulin detemir (LEVEMIR VIAL) 100 UNIT/ML vial     insulin syringe-needle U-100 (29G X 1/2\" 1 ML) 29G X 1/2\" 1 ML miscellaneous     STATIN NOT PRESCRIBED, INTENTIONAL,     Current Facility-Administered Medications   Medication     cefTRIAXone (ROCEPHIN) in lidocaine 1% (PF) injection 1 g         Review of Systems  Constitutional, HEENT, cardiovascular, pulmonary, gi and gu systems are negative, except as otherwise noted.      Objective    BP (!) 148/80 (BP Location: Right arm)   Pulse 116   Temp 99.7  F (37.6  C) (Tympanic)   Resp 24   Ht 1.727 m (5' 8\")   Wt 103 kg (227 lb)   SpO2 98%   BMI 34.52 kg/m    Physical Exam   GENERAL: healthy, alert and no distress  MS: no gross musculoskeletal defects noted, no edema  SKIN: 9x4 cm abscess on left perineum/perianal with areas of induration and areas of fluctuance, very tender to palpation, warm, erythema in surrounding tissues extending to medial thigh.  NEURO: Normal strength and tone, " mentation intact and speech normal  PSYCH: mentation appears normal, affect normal/bright

## 2022-10-29 NOTE — PATIENT INSTRUCTIONS
Keep the area covered for at least 24 hours. Apply bacitracin and cover with gauze pad and secure with tape.    Start oral antibiotic Augmentin twice daily for 10 days.    Rocephin antibiotic injection given in clinic today.    If having fevers, chills or worsening of the swelling, redness and warmth go to the ER as that may mean you need IV antibiotics.    Schedule appointment with general surgeon to recheck the abscess next week.    Gabrielle Lynn, CNP

## 2022-10-31 ENCOUNTER — DOCUMENTATION ONLY (OUTPATIENT)
Dept: LAB | Facility: CLINIC | Age: 27
End: 2022-10-31

## 2022-10-31 DIAGNOSIS — E10.9 CONTROLLED TYPE 1 DIABETES MELLITUS WITHOUT COMPLICATION, WITH LONG-TERM CURRENT USE OF INSULIN (H): Primary | ICD-10-CM

## 2022-10-31 NOTE — PROGRESS NOTES
Patient has lab appointment at 1630 for Diabetic check. Please place order for appointment.  Thank you lab

## 2022-11-11 ENCOUNTER — OFFICE VISIT (OUTPATIENT)
Dept: FAMILY MEDICINE | Facility: CLINIC | Age: 27
End: 2022-11-11
Payer: COMMERCIAL

## 2022-11-11 VITALS
TEMPERATURE: 99.3 F | BODY MASS INDEX: 33.49 KG/M2 | OXYGEN SATURATION: 98 % | SYSTOLIC BLOOD PRESSURE: 138 MMHG | HEIGHT: 68 IN | DIASTOLIC BLOOD PRESSURE: 88 MMHG | WEIGHT: 221 LBS | HEART RATE: 88 BPM | RESPIRATION RATE: 18 BRPM

## 2022-11-11 DIAGNOSIS — Z13.220 SCREENING FOR HYPERLIPIDEMIA: ICD-10-CM

## 2022-11-11 DIAGNOSIS — F33.1 MODERATE EPISODE OF RECURRENT MAJOR DEPRESSIVE DISORDER (H): ICD-10-CM

## 2022-11-11 DIAGNOSIS — I10 ESSENTIAL HYPERTENSION: ICD-10-CM

## 2022-11-11 DIAGNOSIS — E10.65 TYPE 1 DIABETES MELLITUS WITH HYPERGLYCEMIA (H): ICD-10-CM

## 2022-11-11 DIAGNOSIS — I47.10 SVT (SUPRAVENTRICULAR TACHYCARDIA) (H): ICD-10-CM

## 2022-11-11 DIAGNOSIS — K61.1 PERIRECTAL ABSCESS: ICD-10-CM

## 2022-11-11 DIAGNOSIS — E10.9 CONTROLLED TYPE 1 DIABETES MELLITUS WITHOUT COMPLICATION, WITH LONG-TERM CURRENT USE OF INSULIN (H): Primary | ICD-10-CM

## 2022-11-11 LAB
ANION GAP SERPL CALCULATED.3IONS-SCNC: 13 MMOL/L (ref 7–15)
BUN SERPL-MCNC: 10 MG/DL (ref 6–20)
CALCIUM SERPL-MCNC: 9.5 MG/DL (ref 8.6–10)
CHLORIDE SERPL-SCNC: 98 MMOL/L (ref 98–107)
CHOLEST SERPL-MCNC: 224 MG/DL
CREAT SERPL-MCNC: 0.83 MG/DL (ref 0.67–1.17)
CREAT UR-MCNC: 81.4 MG/DL
DEPRECATED HCO3 PLAS-SCNC: 25 MMOL/L (ref 22–29)
GFR SERPL CREATININE-BSD FRML MDRD: >90 ML/MIN/1.73M2
GLUCOSE SERPL-MCNC: 102 MG/DL (ref 70–99)
HBA1C MFR BLD: 7.3 % (ref 0–5.6)
HDLC SERPL-MCNC: 41 MG/DL
LDLC SERPL CALC-MCNC: 166 MG/DL
MICROALBUMIN UR-MCNC: <12 MG/L
MICROALBUMIN/CREAT UR: NORMAL MG/G{CREAT}
NONHDLC SERPL-MCNC: 183 MG/DL
POTASSIUM SERPL-SCNC: 4.1 MMOL/L (ref 3.4–5.3)
SODIUM SERPL-SCNC: 136 MMOL/L (ref 136–145)
TRIGL SERPL-MCNC: 86 MG/DL

## 2022-11-11 PROCEDURE — 80048 BASIC METABOLIC PNL TOTAL CA: CPT | Performed by: FAMILY MEDICINE

## 2022-11-11 PROCEDURE — 80061 LIPID PANEL: CPT | Performed by: FAMILY MEDICINE

## 2022-11-11 PROCEDURE — 99214 OFFICE O/P EST MOD 30 MIN: CPT | Performed by: FAMILY MEDICINE

## 2022-11-11 PROCEDURE — 36415 COLL VENOUS BLD VENIPUNCTURE: CPT | Performed by: FAMILY MEDICINE

## 2022-11-11 PROCEDURE — 82043 UR ALBUMIN QUANTITATIVE: CPT | Performed by: FAMILY MEDICINE

## 2022-11-11 PROCEDURE — 83036 HEMOGLOBIN GLYCOSYLATED A1C: CPT | Performed by: FAMILY MEDICINE

## 2022-11-11 RX ORDER — INSULIN ASPART 100 [IU]/ML
INJECTION, SOLUTION INTRAVENOUS; SUBCUTANEOUS
Qty: 300 ML | Refills: 3 | Status: SHIPPED | OUTPATIENT
Start: 2022-11-11 | End: 2023-12-05

## 2022-11-11 RX ORDER — IBUPROFEN 200 MG
1 TABLET ORAL DAILY
Qty: 200 EACH | Refills: 3 | Status: SHIPPED | OUTPATIENT
Start: 2022-11-11 | End: 2023-12-05

## 2022-11-11 RX ORDER — SYRINGE-NEEDLE,INSULIN,0.5 ML 27GX1/2"
SYRINGE, EMPTY DISPOSABLE MISCELLANEOUS
Qty: 600 EACH | Refills: 3 | Status: SHIPPED | OUTPATIENT
Start: 2022-11-11 | End: 2023-12-05

## 2022-11-11 ASSESSMENT — PAIN SCALES - GENERAL: PAINLEVEL: NO PAIN (0)

## 2022-11-11 ASSESSMENT — PATIENT HEALTH QUESTIONNAIRE - PHQ9
SUM OF ALL RESPONSES TO PHQ QUESTIONS 1-9: 15
SUM OF ALL RESPONSES TO PHQ QUESTIONS 1-9: 15
10. IF YOU CHECKED OFF ANY PROBLEMS, HOW DIFFICULT HAVE THESE PROBLEMS MADE IT FOR YOU TO DO YOUR WORK, TAKE CARE OF THINGS AT HOME, OR GET ALONG WITH OTHER PEOPLE: SOMEWHAT DIFFICULT

## 2022-11-11 NOTE — PATIENT INSTRUCTIONS
Diabetic lab work today.     Follow up with general surgery.     Start back on Augmentin twice daily for 7 days.     Zoloft 50 mg daily for first 10 days take a half tablet.     Follow up in 4-6 weeks.     The risks, benefits and treatment options of prescribed medications or other treatments have been discussed with the patient. The patient verbalized their understanding and should call or follow up if no improvement or if they develop further problems.    Crisis Resources:    National Suicide Prevention Lifeline: 992.552.3681 (TTY: 921.958.8334). Call anytime for help.  (www.suicidepreventionlifeline.org)  National [place] on Mental Illness (www.azeb.org): 680.623.9957 or 218-501-0007.   Mental Health Association (www.mentalhealth.org): 138.256.1096 or 590-175-5600.  [place] Crisis Text Line: Text [place] to 043354  Suicide LifeLine Chat: suicidepreventionlifeline.org/chat

## 2022-11-11 NOTE — PROGRESS NOTES
"  Assessment & Plan     Controlled type 1 diabetes mellitus without complication, with long-term current use of insulin (H)  -- Currently well controlled on current regimen. Defers meeting with Endocrinology, diabetic education. Continue current cares at this time.   - insulin aspart (NOVOLOG VIAL) 100 UNITS/ML vial  Dispense: 300 mL; Refill: 3  - insulin syringe-needle U-100 (29G X 1/2\" 1 ML) 29G X 1/2\" 1 ML miscellaneous  Dispense: 200 each; Refill: 3  - insulin syringe-needle U-100 (BD INSULIN SYRINGE U/F) 31G X 5/16\" 0.5 ML miscellaneous  Dispense: 600 each; Refill: 3  - Hemoglobin A1c    Type 1 diabetes mellitus with hyperglycemia (H)  - Lipid panel reflex to direct LDL Fasting  - Albumin Random Urine Quantitative with Creat Ratio    Essential hypertension  - BASIC METABOLIC PANEL    Screening for hyperlipidemia  - Lipid panel reflex to direct LDL Fasting    Perirectal abscess  S/p Incision and drainage. Due to location of abscess and continued symptoms although improving will refer to Gen surgery for further evaluation and cares. Will restart on Augmentin for next 7 days.   - amoxicillin-clavulanate (AUGMENTIN) 875-125 MG tablet  Dispense: 14 tablet; Refill: 0  - Adult General Surg Referral    Moderate episode of recurrent major depressive disorder (H)  Mood has been down recently. Fleeting thoughts of SI. No plan. Not interested in therapy. Start patient on Zoloft 25 mg daily for 10 days then uptitrate to 50 mg after that. Follow up in 2-4 weeks. Crisis resources discussed.  - sertraline (ZOLOFT) 50 MG tablet  Dispense: 90 tablet; Refill: 1    SVT (supraventricular tachycardia) (H)  Known issue that I take into account for their medical decisions; no current exacerbations or new concerns.       The risks, benefits and treatment options of prescribed medications or other treatments have been discussed with the patient. The patient verbalized their understanding and should call or follow up if no improvement or " if they develop further problems.      Depression Screening Follow Up    PHQ 11/11/2022   PHQ-9 Total Score 15   Q9: Thoughts of better off dead/self-harm past 2 weeks Several days   F/U: Thoughts of suicide or self-harm Yes   F/U: Self harm-plan No   F/U: Self-harm action No   F/U: Safety concerns No       DO OG Hagen [facility] SUSANA Leavitt is a 26 year old, presenting for the following health issues:  ER F/U      HPI     ED/UC Followup:    Facility: [facility]   Date of visit: 10/28/2022  Reason for visit: perirectal abscess, cellulitis.   Current Status: getting better    Diabetes Follow-up    How often are you checking your blood sugar? Four or more times daily  Blood sugar testing frequency justification:  needed for diabetic control   What time of day are you checking your blood sugars (select all that apply)?  Before meals and At bedtime  Have you had any blood sugars above 200?  Yes occ  Have you had any blood sugars below 70?  Yes occ    What symptoms do you notice when your blood sugar is low?  Shaky, Dizzy, Weak, Lethargy, Blurred vision and Confusion    What concerns do you have today about your diabetes? None     Do you have any of these symptoms? (Select all that apply)  No numbness or tingling in feet.  No redness, sores or blisters on feet.  No complaints of excessive thirst.  No reports of blurry vision.  No significant changes to weight.    Have you had a diabetic eye exam in the last 12 months? No    He doesn't have an Endocrinologist due to financial issues. Not interested in diabetic education or seeing an Endocrinologist at this time.   Not interested in continued glucose monitoring.     Last A1c of 6.7  Insulin detemir 43 units BID.   Using insulin aspart using 1 unit for every 10 grams of carbs.   Checking blood sugars about 6-8 times per day.     Not interested in seeing Endocrinology at this time.   Not interested in seeing a  "diabetic educator.       Perirectal abscess.   Urgent care visit on 10/28/22   Had incision and drainage of abscess at that time.   Did not follow up with general surgery.   No longer having pain in the buttock region.   Continues to feel like there is some remnants there.   Completed 10 day course of Augmentin.   Feels like has a bump in his right groin region.       Mood   Feels down in the dumps more recently.   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   Has had thoughts about not feeling great and not happy.   Not going to hurt himself.   Fleeting thoughts of SI.   Lives alone currently in a [email or web address] apartment.   No drug or alcohol abuse.   Not interested in seeing a therapist.       BP Readings from Last 2 Encounters:   11/11/22 138/88   10/28/22 (!) 148/80     Hemoglobin A1C (%)   Date Value   01/08/2021 6.7 (H)   10/23/2019 7.1 (H)     LDL Cholesterol Calculated (mg/dL)   Date Value   01/08/2021 134 (H)   04/06/2019 120 (H)                 How many servings of fruits and vegetables do you eat daily?  0-1    On average, how many sweetened beverages do you drink each day (Examples: soda, juice, sweet tea, etc.  Do NOT count diet or artificially sweetened beverages)?   Juice as needed for low blood sugar    How many days per week do you exercise enough to make your heart beat faster? 3 or less    How many minutes a day do you exercise enough to make your heart beat faster? 9 or less  How many days per week do you miss taking your medication? 1    What makes it hard for you to take your medications?  remembering to take-slept in      Review of Systems   Constitutional, HEENT, cardiovascular, pulmonary, gi and gu systems are negative, except as otherwise noted.      Objective    /88 (BP Location: Right arm, Patient Position: Chair, Cuff Size: Adult Large)   Pulse 88   Temp 99.3  F (37.4  C) (Tympanic)   Resp 18   Ht 1.727 m (5' 8\")   Wt 100.2 kg (221 lb)   SpO2 98%   BMI 33.60 kg/m    Body mass " index is 33.6 kg/m .  Physical Exam   General: alert, cooperative, no acute distress   CV: RRR, no murmur  Resp: non-labored breathing, clear to auscultation, no wheezing or rales   Abdomen: Soft, non-tender, no guarding.   : continues to have erythema and some mild tenderness where prior I&D was completed in urgent care. Has a palpable right groin lymph node.       Answers for HPI/ROS submitted by the patient on 11/11/2022  If you checked off any problems, how difficult have these problems made it for you to do your work, take care of things at home, or get along with other people?: Somewhat difficult  PHQ9 TOTAL SCORE: 15

## 2022-11-12 ENCOUNTER — MYC MEDICAL ADVICE (OUTPATIENT)
Dept: FAMILY MEDICINE | Facility: CLINIC | Age: 27
End: 2022-11-12

## 2022-11-19 ENCOUNTER — HEALTH MAINTENANCE LETTER (OUTPATIENT)
Age: 27
End: 2022-11-19

## 2022-11-22 ENCOUNTER — OFFICE VISIT (OUTPATIENT)
Dept: SURGERY | Facility: CLINIC | Age: 27
End: 2022-11-22
Payer: COMMERCIAL

## 2022-11-22 VITALS
TEMPERATURE: 98.6 F | DIASTOLIC BLOOD PRESSURE: 85 MMHG | BODY MASS INDEX: 33.48 KG/M2 | WEIGHT: 220.9 LBS | SYSTOLIC BLOOD PRESSURE: 126 MMHG | HEART RATE: 96 BPM | HEIGHT: 68 IN

## 2022-11-22 DIAGNOSIS — K61.1 PERIRECTAL ABSCESS: ICD-10-CM

## 2022-11-22 DIAGNOSIS — K64.0 GRADE I INTERNAL HEMORRHOIDS: Primary | ICD-10-CM

## 2022-11-22 PROCEDURE — 99243 OFF/OP CNSLTJ NEW/EST LOW 30: CPT | Performed by: SURGERY

## 2022-11-22 NOTE — NURSING NOTE
"Initial /85 (BP Location: Right arm, Patient Position: Sitting, Cuff Size: Adult Large)   Pulse 96   Temp 98.6  F (37  C) (Tympanic)   Ht 1.727 m (5' 7.99\")   Wt 100.2 kg (220 lb 14.4 oz)   BMI 33.60 kg/m   Estimated body mass index is 33.6 kg/m  as calculated from the following:    Height as of this encounter: 1.727 m (5' 7.99\").    Weight as of this encounter: 100.2 kg (220 lb 14.4 oz). .    Tracee Cox MA    "

## 2022-11-22 NOTE — LETTER
11/22/2022         RE: Tree Conti  [address] [facility] [address]        Dear Colleague,    Thank you for referring your patient, Tree Conti, to the [facility]. Please see a copy of my visit note below.    Surgical Consultation/History and Physical  [facility] Surgery    Tree is seen in consultation for Perirectal abscess, at the request of Radha Roque MD.    Chief Complaint:  Perirectal abscess    History of Present Illness: Tree Conti is a 27 year old male presents with perirectal abscess.  He was seen in UC, had an incision and drainage and was treated with prolonged course of antibiotics.  Since that time he has had intermittent bloody drainage from the area.  It does not cause pain.  No fevers or chills, his blood glucose has been in its usual range for him.  Last A1c was 7.  He has no history of abscess prior to this.  No known hemorrhoids.      No known history of Crohn's or UC in family.  No colorectal cancer.      He has a history of Celiac disease.    Patient Active Problem List   Diagnosis     Celiac disease     CARDIOVASCULAR SCREENING; LDL GOAL LESS THAN 100     Tobacco abuse     SVT (supraventricular tachycardia) (H)     Controlled type 1 diabetes mellitus without complication, with long-term current use of insulin (H)     Essential hypertension     Hyperlipidemia LDL goal <100     Pigmented skin lesions     Type 1 diabetes mellitus with hyperglycemia (H)     Past Medical History:   Diagnosis Date     Celiac disease      Diabetes mellitus (H)     type 1     Paroxysmal supraventricular tachycardia (H)        Past Surgical History:   Procedure Laterality Date     EP ABLATION / EP STUDIES  08/18/2016    AVRT ablation     HC TOOTH EXTRACTION W/FORCEP      april       Family History   Problem Relation Age of Onset     Heart Disease Mother        Social History     Tobacco Use     Smoking status: Former     Packs/day: 0.25     Types:  "Cigarettes     Smokeless tobacco: Former     Tobacco comments:     1/4 to 1/2 ppd.   Substance Use Topics     Alcohol use: Not Currently     Alcohol/week: 0.0 standard drinks     Comment: Not currently drinking.  Was at 4-5 times per week, 5-6 drinks per time.- HAS NOT DRANK IN 3 months        History   Drug Use     Types: Marijuana     Comment: Occ use.       Current Outpatient Medications   Medication Sig Dispense Refill     insulin aspart (NOVOLOG VIAL) 100 UNITS/ML vial Sig: USE 1 UNIT EVERY 10 GRAMS CARB. 1 UNIT FOR EVERY 50 POINTS OVER 150. UP TO 50 UNITS DAILY doing 33U per 100, 300 mL 3     insulin detemir (LEVEMIR VIAL) 100 UNIT/ML vial Inject 43 Units Subcutaneous 2 times daily 77.4 mL 3     insulin syringe-needle U-100 (29G X 1/2\" 1 ML) 29G X 1/2\" 1 ML miscellaneous Inject 1 each Subcutaneous daily 200 each 3     insulin syringe-needle U-100 (BD INSULIN SYRINGE U/F) 31G X 5/16\" 0.5 ML miscellaneous USE ONE SYRINGE 6 TIMES DAILY OR AS DIRECTED. Strength: 31G X 5/16\" 0.5  each 3     sertraline (ZOLOFT) 50 MG tablet Take 1 tablet (50 mg) by mouth daily 90 tablet 1     STATIN NOT PRESCRIBED, INTENTIONAL, Statin not prescribed intentionally due to Not indicated based on age 0 each 0       Allergies   Allergen Reactions     Gluten Other (See Comments)     No Wheat or Gluten- Pt has celiac       Review of Systems:   10 point ROS otherwise negative    Physical Exam:  /85 (BP Location: Right arm, Patient Position: Sitting, Cuff Size: Adult Large)   Pulse 96   Temp 98.6  F (37  C) (Tympanic)   Ht 1.727 m (5' 7.99\")   Wt 100.2 kg (220 lb 14.4 oz)   BMI 33.60 kg/m      Constitutional- No acute distress, well nourished, non-toxic  Eyes: Anicteric, no injection.  PERRL  ENT:  Normocephalic, atraumatic, Nose midline, moist mucus membranes  Neck - supple, no LAD  Respiratory- good inspiratory effort  Cardiovascular -  No peripheral edema.  No clubbing.  Rectal - Increased tone; at 6 O'clock small opening " consistent with previous incision and drainage.  No surrounding cellulitis.  Anoscopy performed.  Small Grade I internal hemorrhoids without inflammation.    Neuro - No focal neuro deficits, Alert and oriented x 3  Psych: Appropriate mood and affect  Musculoskeletal: Normal gait, symmetric strength.  FROM upper and lower extremities.  Skin: Warm, Dry    Assessment:  1. Grade I internal hemorrhoids    2. Perirectal abscess      Plan:   Mr. Conti presents with recently drained perianal abscess.  On exam he has no obvious fistula nor history suggestive at this time.  He has a small non healing wound which may be related to his diabetes (slow healing).  I recommended continued observation and bowel hygiene.  He has no alarm symptoms at this time.  I reviewed the KRAMES hemorrhoid guide with him and advised on high fiber diet and bowel hygiene.  He will have a follow-up in 1 month via phone, if no improvement will consider rectal exam under anesthesia and potential colonoscopy.  All questions answered.  Warning signs for early return provided.      Jonatan Hassan DO on 11/22/2022 at 7:06 AM        Again, thank you for allowing me to participate in the care of your patient.        Sincerely,        Jonatan Hassan DO

## 2022-11-22 NOTE — PROGRESS NOTES
Surgical Consultation/History and Physical  [facility] Surgery    Tree is seen in consultation for Perirectal abscess, at the request of Radha Roque MD.    Chief Complaint:  Perirectal abscess    History of Present Illness: Tree Conti is a 27 year old male presents with perirectal abscess.  He was seen in UC, had an incision and drainage and was treated with prolonged course of antibiotics.  Since that time he has had intermittent bloody drainage from the area.  It does not cause pain.  No fevers or chills, his blood glucose has been in its usual range for him.  Last A1c was 7.  He has no history of abscess prior to this.  No known hemorrhoids.      No known history of Crohn's or UC in family.  No colorectal cancer.      He has a history of Celiac disease.    Patient Active Problem List   Diagnosis     Celiac disease     CARDIOVASCULAR SCREENING; LDL GOAL LESS THAN 100     Tobacco abuse     SVT (supraventricular tachycardia) (H)     Controlled type 1 diabetes mellitus without complication, with long-term current use of insulin (H)     Essential hypertension     Hyperlipidemia LDL goal <100     Pigmented skin lesions     Type 1 diabetes mellitus with hyperglycemia (H)     Past Medical History:   Diagnosis Date     Celiac disease      Diabetes mellitus (H)     type 1     Paroxysmal supraventricular tachycardia (H)        Past Surgical History:   Procedure Laterality Date     EP ABLATION / EP STUDIES  08/18/2016    AVRT ablation     HC TOOTH EXTRACTION W/FORCEP      april       Family History   Problem Relation Age of Onset     Heart Disease Mother        Social History     Tobacco Use     Smoking status: Former     Packs/day: 0.25     Types: Cigarettes     Smokeless tobacco: Former     Tobacco comments:     1/4 to 1/2 ppd.   Substance Use Topics     Alcohol use: Not Currently     Alcohol/week: 0.0 standard drinks     Comment: Not currently drinking.  Was at 4-5 times per week, 5-6 drinks  "per time.- HAS NOT DRANK IN 3 months        History   Drug Use     Types: Marijuana     Comment: Occ use.       Current Outpatient Medications   Medication Sig Dispense Refill     insulin aspart (NOVOLOG VIAL) 100 UNITS/ML vial Sig: USE 1 UNIT EVERY 10 GRAMS CARB. 1 UNIT FOR EVERY 50 POINTS OVER 150. UP TO 50 UNITS DAILY doing 33U per 100, 300 mL 3     insulin detemir (LEVEMIR VIAL) 100 UNIT/ML vial Inject 43 Units Subcutaneous 2 times daily 77.4 mL 3     insulin syringe-needle U-100 (29G X 1/2\" 1 ML) 29G X 1/2\" 1 ML miscellaneous Inject 1 each Subcutaneous daily 200 each 3     insulin syringe-needle U-100 (BD INSULIN SYRINGE U/F) 31G X 5/16\" 0.5 ML miscellaneous USE ONE SYRINGE 6 TIMES DAILY OR AS DIRECTED. Strength: 31G X 5/16\" 0.5  each 3     sertraline (ZOLOFT) 50 MG tablet Take 1 tablet (50 mg) by mouth daily 90 tablet 1     STATIN NOT PRESCRIBED, INTENTIONAL, Statin not prescribed intentionally due to Not indicated based on age 0 each 0       Allergies   Allergen Reactions     Gluten Other (See Comments)     No Wheat or Gluten- Pt has celiac       Review of Systems:   10 point ROS otherwise negative    Physical Exam:  /85 (BP Location: Right arm, Patient Position: Sitting, Cuff Size: Adult Large)   Pulse 96   Temp 98.6  F (37  C) (Tympanic)   Ht 1.727 m (5' 7.99\")   Wt 100.2 kg (220 lb 14.4 oz)   BMI 33.60 kg/m      Constitutional- No acute distress, well nourished, non-toxic  Eyes: Anicteric, no injection.  PERRL  ENT:  Normocephalic, atraumatic, Nose midline, moist mucus membranes  Neck - supple, no LAD  Respiratory- good inspiratory effort  Cardiovascular -  No peripheral edema.  No clubbing.  Rectal - Increased tone; at 6 O'clock small opening consistent with previous incision and drainage.  No surrounding cellulitis.  Anoscopy performed.  Small Grade I internal hemorrhoids without inflammation.    Neuro - No focal neuro deficits, Alert and oriented x 3  Psych: Appropriate mood and " affect  Musculoskeletal: Normal gait, symmetric strength.  FROM upper and lower extremities.  Skin: Warm, Dry    Assessment:  1. Grade I internal hemorrhoids    2. Perirectal abscess      Plan:   Mr. Conti presents with recently drained perianal abscess.  On exam he has no obvious fistula nor history suggestive at this time.  He has a small non healing wound which may be related to his diabetes (slow healing).  I recommended continued observation and bowel hygiene.  He has no alarm symptoms at this time.  I reviewed the KRAMES hemorrhoid guide with him and advised on high fiber diet and bowel hygiene.  He will have a follow-up in 1 month via phone, if no improvement will consider rectal exam under anesthesia and potential colonoscopy.  All questions answered.  Warning signs for early return provided.      Jonatan Hassan,  on 11/22/2022 at 7:06 AM

## 2023-04-09 ENCOUNTER — HEALTH MAINTENANCE LETTER (OUTPATIENT)
Age: 28
End: 2023-04-09

## 2023-04-13 DIAGNOSIS — E10.9 CONTROLLED TYPE 1 DIABETES MELLITUS WITHOUT COMPLICATION, WITH LONG-TERM CURRENT USE OF INSULIN (H): ICD-10-CM

## 2023-04-13 NOTE — TELEPHONE ENCOUNTER
Pending Prescriptions:                       Disp   Refills    insulin detemir (LEVEMIR VIAL) 100 UNIT/M*77.4 mL3            Sig: Inject 43 Units Subcutaneous 2 times daily

## 2023-04-14 ENCOUNTER — MYC MEDICAL ADVICE (OUTPATIENT)
Dept: FAMILY MEDICINE | Facility: CLINIC | Age: 28
End: 2023-04-14

## 2023-04-14 NOTE — TELEPHONE ENCOUNTER
Pending Prescriptions:                       Disp   Refills    insulin detemir (LEVEMIR VIAL) 100 UNIT/ML*77.4 mL0        Sig: Inject 43 Units Subcutaneous 2 times daily        Routing refill request to provider for review/approval because:  Dose exceeds daily recommended dose.   Due for diabetes check-up and labs in May.  [facility] sent advising patient to schedule appt.     Last office visit: 11/11/2022 with prescribing provider.      Gabriela Pittman RN

## 2023-06-01 ENCOUNTER — HEALTH MAINTENANCE LETTER (OUTPATIENT)
Age: 28
End: 2023-06-01

## 2023-11-27 ENCOUNTER — MYC MEDICAL ADVICE (OUTPATIENT)
Dept: FAMILY MEDICINE | Facility: CLINIC | Age: 28
End: 2023-11-27
Payer: COMMERCIAL

## 2023-11-27 DIAGNOSIS — E10.65 TYPE 1 DIABETES MELLITUS WITH HYPERGLYCEMIA (H): Primary | ICD-10-CM

## 2023-11-28 NOTE — TELEPHONE ENCOUNTER
Dr Roque, Can we order a referral to Care Coordination for this patient?  He said he can't afford his doctor's visits and that 's why he has been missing them.

## 2023-12-05 ENCOUNTER — ALLIED HEALTH/NURSE VISIT (OUTPATIENT)
Dept: EDUCATION SERVICES | Facility: CLINIC | Age: 28
End: 2023-12-05
Attending: FAMILY MEDICINE
Payer: COMMERCIAL

## 2023-12-05 DIAGNOSIS — E10.9 CONTROLLED TYPE 1 DIABETES MELLITUS WITHOUT COMPLICATION, WITH LONG-TERM CURRENT USE OF INSULIN (H): ICD-10-CM

## 2023-12-05 PROCEDURE — G0108 DIAB MANAGE TRN  PER INDIV: HCPCS | Performed by: DIETITIAN, REGISTERED

## 2023-12-05 NOTE — TELEPHONE ENCOUNTER
Hi ,     Please note if you approve of this plan or indicate an alternate plan.   Prescriptions pended as noted below.     I had an in person visit with Tree today.  He will get the labs done and I encouraged him to follow up with you after these labs are completed.     We were recently notified that [employer] is discontinuing Levemir insulin in 2024, so we will need to switch his prescription to Lantus.  He also got a letter from [facility] explaining coverage and Lantus now being preferred.  I pended these prescriptions if you are ok with that.   With switching insulin types, recommend continuing BID dosing, however decrease by 20% for the initial switch and then OK to titrate back depending on blood sugars.  (43 units BID to 35 units BID, keep max dose at 89 units per 24 hours, 8 vials = 90 day supply).  Novolog continues as the preferred rapid acting insulin         Thanks!  Ely Ty RD, LD, [facility]  Diabetes Education

## 2023-12-05 NOTE — PROGRESS NOTES
Diabetes Self-Management Education & Support    Presents for: Individual review  Type of Service: In Person Visit      ASSESSMENT:  Tree has had type 1 Diabetes for many years and monitors blood sugars closely with finger stick 5-8 times daily.  Has been able to keep A1c in the 6s and low 7s.  Has not used a continuous glucose monitor or looked into insulin pumps much due to cost.  Has a high deductible plan and tries to keep medical costs at a minium.  Has been diligent with [facility] to keep blood sugars well controlled.  Knows that his formulary next year is going to prefer lantus over Levemir.  Levemir is being discontinued by [employer].  He has been on lantus in the past and felt like dose was lower.  Reviewed basal insulin history.  Could try changing over at a 20% reduction especially since he is on a larger dose of basal insulin 0.86 units/kg.  Discussed cash pay options for Free[employer] 3 and how this could be a tool used even if intermittently to help with figuring out insulin dosing / control etc.  Briefly discussed current pumps and the recent advances with automated dosing.  If health care plan changes / saves money in HSA or meets deductible he may consider looking into a pump one day.  Is due for labs and will have these checked soon.  Recommend follow up with PCP after labs are completed.     [facility] for prescriptions   [facility]     Patient's most recent   is not meeting goal of <7.0  Lab Results   Component Value Date    A1C 7.3 11/11/2022    A1C 6.7 01/08/2021    A1C 7.1 10/23/2019    A1C 6.7 04/06/2019    A1C 7.0 07/27/2018    A1C 7.2 12/08/2017         Diabetes knowledge and skills assessment:   Patient is knowledgeable in diabetes management concepts related to: Healthy Eating, Being Active, Monitoring, Taking Medication, Problem Solving, Reducing Risks, and Healthy Coping  Continue education with the following diabetes management concepts: continuous glucose monitoring,  "medication review as needed   Based on learning assessment above, most appropriate setting for further diabetes education would be: Individual setting.      PLAN  Telephone encounter to PCP about change in formulary for 2024.     SUBJECTIVE/OBJECTIVE:  Presents for: Individual review  Accompanied by: Self  Diabetes education in the past 24mo: No  Focus of Visit: Monitoring, Reducing Risks, Taking Medication, Healthy Eating  Diabetes type: Type 1  Disease course: Stable  How confident are you filling out medical forms by yourself:: Extremely  Other concerns:: None  Cultural Influences/Ethnic Background:  Choose not to answer    Diabetes Symptoms & Complications:     Complications assessed today?: No    Patient Problem List and Family Medical History reviewed for relevant medical history, current medical status, and diabetes risk factors.    Vitals:  There were no vitals taken for this visit.  Estimated body mass index is 33.6 kg/m  as calculated from the following:    Height as of 11/22/22: 1.727 m (5' 7.99\").    Weight as of 11/22/22: 100.2 kg (220 lb 14.4 oz).   Last 3 BP:   BP Readings from Last 3 Encounters:   11/22/22 126/85   11/11/22 138/88   10/28/22 (!) 148/80       History   Smoking Status    Former    Packs/day: 0.25    Types: Cigarettes   Smokeless Tobacco    Former       Labs:  Lab Results   Component Value Date    A1C 7.3 11/11/2022    A1C 6.7 01/08/2021     Lab Results   Component Value Date     11/11/2022     01/08/2021     Lab Results   Component Value Date     11/11/2022     01/08/2021     HDL Cholesterol   Date Value Ref Range Status   01/08/2021 46 >39 mg/dL Final     Direct Measure HDL   Date Value Ref Range Status   11/11/2022 41 >=40 mg/dL Final   ]  GFR Estimate   Date Value Ref Range Status   11/11/2022 >90 >60 mL/min/1.73m2 Final     Comment:     Effective December 21, 2021 eGFRcr in adults is calculated using the 2021 CKD-EPI creatinine equation which includes " "age and gender (Micheal shah al., NEJ, DOI: 10.1056/OIZUdp4098366)   01/08/2021 >90 >60 mL/min/[1.73_m2] Final     Comment:     Non  GFR Calc  Starting 12/18/2018, serum creatinine based estimated GFR (eGFR) will be   calculated using the Chronic Kidney Disease Epidemiology Collaboration   (CKD-EPI) equation.       GFR Estimate If Black   Date Value Ref Range Status   01/08/2021 >90 >60 mL/min/[1.73_m2] Final     Comment:      GFR Calc  Starting 12/18/2018, serum creatinine based estimated GFR (eGFR) will be   calculated using the Chronic Kidney Disease Epidemiology Collaboration   (CKD-EPI) equation.       Lab Results   Component Value Date    CR 0.83 11/11/2022    CR 0.74 01/08/2021     No results found for: \"MICROALBUMIN\"    Healthy Eating:  Healthy Eating Assessed Today: Yes  Meal planning/habits: Carb counting    Being Active:  Being Active Assessed Today: No    Monitoring:  Monitoring Assessed Today: Yes  Times checking blood sugar at home (number): 5+  Times checking blood sugar at home (per): Day    Taking Medications:  Diabetes Medication(s)       Insulin       insulin aspart (NOVOLOG VIAL) 100 UNITS/ML vial Sig: USE 1 UNIT EVERY 10 GRAMS CARB. 1 UNIT FOR EVERY 50 POINTS OVER 150. (Max dose 67 units per 24 hours)     insulin glargine (LANTUS VIAL) 100 UNIT/ML vial Inject 35 units twice daily with OK for adjustment back to 43 units twice daily.  (Max dose with priming 89 units per 24 hours).     LEVEMIR VIAL 100 UNIT/ML soln INJECT 43 UNITS SUBCUTANEOUS 2 TIMES DAILY            Taking Medication Assessed Today: Yes    Problem Solving:  Problem Solving Assessed Today: Yes  Is the patient at risk for hypoglycemia?: Yes              Reducing Risks:  Reducing Risks Assessed Today: Yes    Healthy Coping:  Healthy Coping Assessed Today: Yes  Emotional response to diabetes: Ready to learn  Stage of change: ACTION (Actively working towards change)  Support resources: Websites  Patient " Activation Measure Survey Score:       No data to display                  Care Plan and Education Provided:  There are no care plans that you recently modified to display for this patient.      Miley Ty RD, LD, [facility]  Diabetes Education      Time Spent: 40 minutes  Encounter Type: Individual    Any diabetes medication dose changes were made via the CDE Protocol per the patient's referring provider. A copy of this encounter was shared with the provider.

## 2023-12-05 NOTE — Clinical Note
12/5/2023         RE: Tree Conti  [address] [facility] [address]        Dear Colleague,    Thank you for referring your patient, Tree Conti, to the [facility] DIABETES EDUCATION [place]. Please see a copy of my visit note below.    Diabetes Self-Management Education & Support    Presents for: Individual review  Type of Service: In Person Visit      ASSESSMENT:      [facility] partners.     [facility]     Patient's most recent   Lab Results   Component Value Date    A1C 7.3 11/11/2022    A1C 6.7 01/08/2021     {:630840} meeting goal of {A1C GOALS:863493}    Diabetes knowledge and skills assessment:   Patient is knowledgeable in diabetes management concepts related to: {AADE 7:275659}    Continue education with the following diabetes management concepts: {AADE 7:467040}    Based on learning assessment above, most appropriate setting for further diabetes education would be: {:373592} setting.      PLAN    ***  Topics to cover at upcoming visits: {AADE 7:467263}    Follow-up: ***    See Care Plan for co-developed, patient-state behavior change goals.  AVS provided for patient today.    Education Materials Provided:  {CDE EDUCATION MATERIALS:448848}      SUBJECTIVE/OBJECTIVE:  Presents for: Individual review  Accompanied by: Self  Diabetes education in the past 24mo: No  Focus of Visit: Monitoring, Reducing Risks, Taking Medication, Healthy Eating  Diabetes type: Type 1  Disease course: Stable  How confident are you filling out medical forms by yourself:: Extremely  Other concerns:: None  Cultural Influences/Ethnic Background:  Choose not to answer  ***    Diabetes Symptoms & Complications:     Complications assessed today?: No    Patient Problem List and Family Medical History reviewed for relevant medical history, current medical status, and diabetes risk factors.    Vitals:  There were no vitals taken for this visit.  Estimated body mass index is  "33.6 kg/m  as calculated from the following:    Height as of 11/22/22: 1.727 m (5' 7.99\").    Weight as of 11/22/22: 100.2 kg (220 lb 14.4 oz).   Last 3 BP:   BP Readings from Last 3 Encounters:   11/22/22 126/85   11/11/22 138/88   10/28/22 (!) 148/80       History   Smoking Status    Former    Packs/day: 0.25    Types: Cigarettes   Smokeless Tobacco    Former       Labs:  Lab Results   Component Value Date    A1C 7.3 11/11/2022    A1C 6.7 01/08/2021     Lab Results   Component Value Date     11/11/2022     01/08/2021     Lab Results   Component Value Date     11/11/2022     01/08/2021     HDL Cholesterol   Date Value Ref Range Status   01/08/2021 46 >39 mg/dL Final     Direct Measure HDL   Date Value Ref Range Status   11/11/2022 41 >=40 mg/dL Final   ]  GFR Estimate   Date Value Ref Range Status   11/11/2022 >90 >60 mL/min/1.73m2 Final     Comment:     Effective December 21, 2021 eGFRcr in adults is calculated using the 2021 CKD-EPI creatinine equation which includes age and gender (Micheal et al., NE, DOI: 10.1056/MJXEoz7084599)   01/08/2021 >90 >60 mL/min/[1.73_m2] Final     Comment:     Non  GFR Calc  Starting 12/18/2018, serum creatinine based estimated GFR (eGFR) will be   calculated using the Chronic Kidney Disease Epidemiology Collaboration   (CKD-EPI) equation.       GFR Estimate If Black   Date Value Ref Range Status   01/08/2021 >90 >60 mL/min/[1.73_m2] Final     Comment:      GFR Calc  Starting 12/18/2018, serum creatinine based estimated GFR (eGFR) will be   calculated using the Chronic Kidney Disease Epidemiology Collaboration   (CKD-EPI) equation.       Lab Results   Component Value Date    CR 0.83 11/11/2022    CR 0.74 01/08/2021     No results found for: \"MICROALBUMIN\"    Healthy Eating:  Healthy Eating Assessed Today: Yes  Meal planning/habits: Carb counting    Being Active:  Being Active Assessed Today: No    Monitoring:  Monitoring " Assessed Today: Yes  Times checking blood sugar at home (number): 5+  Times checking blood sugar at home (per): Day    ***    Taking Medications:  Diabetes Medication(s)       Insulin       insulin aspart (NOVOLOG VIAL) 100 UNITS/ML vial Sig: USE 1 UNIT EVERY 10 GRAMS CARB. 1 UNIT FOR EVERY 50 POINTS OVER 150. (Max dose 67 units per 24 hours)     insulin glargine (LANTUS VIAL) 100 UNIT/ML vial Inject 35 units twice daily with OK for adjustment back to 43 units twice daily.  (Max dose with priming 89 units per 24 hours).     LEVEMIR VIAL 100 UNIT/ML soln INJECT 43 UNITS SUBCUTANEOUS 2 TIMES DAILY            Taking Medication Assessed Today: Yes    Problem Solving:  Problem Solving Assessed Today: Yes  Is the patient at risk for hypoglycemia?: Yes              Reducing Risks:  Reducing Risks Assessed Today: Yes    Healthy Coping:  Healthy Coping Assessed Today: Yes  Emotional response to diabetes: Ready to learn  Stage of change: ACTION (Actively working towards change)  Support resources: Websites  Patient Activation Measure Survey Score:       No data to display                  Care Plan and Education Provided:  {Care Plan and Eduction Provided:482364}    ***    Time Spent: {:704923} minutes  Encounter Type: Individual    Any diabetes medication dose changes were made via the CDE Protocol per the patient's {:485785}. A copy of this encounter was shared with the provider.

## 2023-12-06 ENCOUNTER — PATIENT OUTREACH (OUTPATIENT)
Dept: CARE COORDINATION | Facility: CLINIC | Age: 28
End: 2023-12-06
Payer: COMMERCIAL

## 2023-12-06 RX ORDER — SYRINGE-NEEDLE,INSULIN,0.5 ML 27GX1/2"
SYRINGE, EMPTY DISPOSABLE MISCELLANEOUS
Qty: 600 EACH | Refills: 3 | Status: SHIPPED | OUTPATIENT
Start: 2023-12-06

## 2023-12-06 RX ORDER — INSULIN GLARGINE 100 [IU]/ML
INJECTION, SOLUTION SUBCUTANEOUS
Qty: 80 ML | Refills: 1 | Status: SHIPPED | OUTPATIENT
Start: 2023-12-06 | End: 2024-09-23

## 2023-12-06 RX ORDER — INSULIN ASPART 100 [IU]/ML
INJECTION, SOLUTION INTRAVENOUS; SUBCUTANEOUS
Qty: 60 ML | Refills: 3 | Status: SHIPPED | OUTPATIENT
Start: 2023-12-06

## 2023-12-06 NOTE — PROGRESS NOTES
Clinic Care Coordination Contact  [facility]/Voicemail    Clinical Data: Care Coordinator Outreach    Outreach Documentation Number of Outreach Attempt   12/6/2023  10:30 AM 1       Left message on patient's voicemail with call back information and requested return call.    Plan: Care Coordinator will try to reach patient again in 1-2 business days.    KELBY Wilkes  [facility] Care Coordination  [facility]  [phone]

## 2023-12-07 NOTE — PROGRESS NOTES
Clinic Care Coordination Contact  [facility]/Voicemail    Clinical Data: Care Coordinator Outreach    Outreach Documentation Number of Outreach Attempt   12/6/2023  10:30 AM 1   12/7/2023  10:52 AM 2       Left message on patient's voicemail with call back information and requested return call.    Plan: Care Coordinator will send care coordination introduction letter with care coordinator contact information and explanation of care coordination services via [employer]. Care Coordinator will do no further outreaches at this time.    KELBY Wilkes  [facility] Care Coordination  [facility]  [phone]

## 2023-12-09 DIAGNOSIS — E10.9 CONTROLLED TYPE 1 DIABETES MELLITUS WITHOUT COMPLICATION, WITH LONG-TERM CURRENT USE OF INSULIN (H): ICD-10-CM

## 2023-12-11 RX ORDER — INSULIN DETEMIR 100 [IU]/ML
INJECTION, SOLUTION SUBCUTANEOUS
Qty: 80 ML | Refills: 3 | Status: SHIPPED | OUTPATIENT
Start: 2023-12-11 | End: 2024-07-12

## 2023-12-11 NOTE — TELEPHONE ENCOUNTER
"Requested Prescriptions   Pending Prescriptions Disp Refills    LEVEMIR VIAL 100 UNIT/ML soln [Pharmacy Med Name: LEVEMIR 100 UNIT/ML VIAL] 80 mL 3     Sig: INJECT 43 UNITS SUBCUTANEOUS 2 TIMES DAILY       Long Acting Insulin Protocol Failed - 12/9/2023  8:17 AM        Failed - Serum creatinine on file in past 12 months     Recent Labs   Lab Test 11/11/22  1628   CR 0.83       Ok to refill medication if creatinine is low          Failed - HgbA1C in past 3 or 6 months     If HgbA1C is 8 or greater, it needs to be on file within the past 3 months.  If less than 8, must be on file within the past 6 months.     Recent Labs   Lab Test 11/11/22  1628   A1C 7.3*             Failed - Recent (6 mo) or future (30 days) visit within the authorizing provider's specialty     Patient had office visit in the last 6 months or has a visit in the next 30 days with authorizing provider or within the authorizing provider's specialty.  See \"Patient Info\" tab in inbasket, or \"Choose Columns\" in Meds & Orders section of the refill encounter.            Passed - Medication is active on med list        Passed - Patient is age 18 or older             "

## 2024-01-28 ENCOUNTER — HEALTH MAINTENANCE LETTER (OUTPATIENT)
Age: 29
End: 2024-01-28

## 2024-03-25 ENCOUNTER — TELEPHONE (OUTPATIENT)
Dept: FAMILY MEDICINE | Facility: CLINIC | Age: 29
End: 2024-03-25
Payer: COMMERCIAL

## 2024-03-25 NOTE — TELEPHONE ENCOUNTER
Covering for primary/ordering provider:    Patient was switched to Lantus per insurance requirements in December. Does he just need a refill of this?

## 2024-03-25 NOTE — TELEPHONE ENCOUNTER
Alternative requested due to insurance does not cover Levemir 100 unit/ML vial  Please consider changing or submitting a prior authorization  Thank you!

## 2024-03-26 NOTE — TELEPHONE ENCOUNTER
Called and relayed message to pharmacy. They verified that they see Lantus on file and patient has picked up medication.     Katy Freire RN

## 2024-06-04 ENCOUNTER — TELEPHONE (OUTPATIENT)
Dept: FAMILY MEDICINE | Facility: CLINIC | Age: 29
End: 2024-06-04
Payer: COMMERCIAL

## 2024-06-16 ENCOUNTER — HEALTH MAINTENANCE LETTER (OUTPATIENT)
Age: 29
End: 2024-06-16

## 2024-06-17 ASSESSMENT — PATIENT HEALTH QUESTIONNAIRE - PHQ9
SUM OF ALL RESPONSES TO PHQ QUESTIONS 1-9: 7
10. IF YOU CHECKED OFF ANY PROBLEMS, HOW DIFFICULT HAVE THESE PROBLEMS MADE IT FOR YOU TO DO YOUR WORK, TAKE CARE OF THINGS AT HOME, OR GET ALONG WITH OTHER PEOPLE: SOMEWHAT DIFFICULT
SUM OF ALL RESPONSES TO PHQ QUESTIONS 1-9: 7

## 2024-06-18 ENCOUNTER — OFFICE VISIT (OUTPATIENT)
Dept: FAMILY MEDICINE | Facility: CLINIC | Age: 29
End: 2024-06-18
Payer: COMMERCIAL

## 2024-06-18 VITALS
BODY MASS INDEX: 35.01 KG/M2 | SYSTOLIC BLOOD PRESSURE: 132 MMHG | TEMPERATURE: 97.8 F | OXYGEN SATURATION: 98 % | WEIGHT: 231 LBS | RESPIRATION RATE: 18 BRPM | HEIGHT: 68 IN | HEART RATE: 89 BPM | DIASTOLIC BLOOD PRESSURE: 88 MMHG

## 2024-06-18 DIAGNOSIS — K90.0 CELIAC DISEASE: Primary | ICD-10-CM

## 2024-06-18 PROCEDURE — 99213 OFFICE O/P EST LOW 20 MIN: CPT | Performed by: FAMILY MEDICINE

## 2024-06-18 ASSESSMENT — PAIN SCALES - GENERAL: PAINLEVEL: NO PAIN (0)

## 2024-06-18 NOTE — PROGRESS NOTES
"  Assessment & Plan     Celiac disease  Known to have celiac disease, had flare up on May 30 and 31, 2024, disability form signed.  Recommended to follow gluten-free diet.  All questions answered.      Rashel Leavitt is a 28 year old, presenting for the following health issues:  Forms    Known diastolic disease and type 1 diabetes melitis.  Patient was off 2 days from work last month, would like disability form signed.      Review of Systems  Constitutional, HEENT, cardiovascular, pulmonary, gi and gu systems are negative, except as otherwise noted.      Objective    /88 (Cuff Size: Adult Large)   Pulse 89   Temp 97.8  F (36.6  C) (Tympanic)   Resp 18   Ht 1.727 m (5' 7.99\")   Wt 104.8 kg (231 lb)   SpO2 98%   BMI 35.13 kg/m    Body mass index is 35.13 kg/m .  Physical Exam   GENERAL: alert and no distress  NECK: no adenopathy, no asymmetry, masses, or scars  RESP: lungs clear to auscultation - no rales, rhonchi or wheezes  CV: regular rate and rhythm, normal S1 S2, no S3 or S4, no murmur, click or rub, no peripheral edema  ABDOMEN: soft, nontender, no hepatosplenomegaly, no masses and   MS: no gross musculoskeletal defects noted, no edema        Signed Electronically by: Terry Cordero MD    "

## 2024-06-18 NOTE — NURSING NOTE
"Chief Complaint   Patient presents with    Forms     /88 (Cuff Size: Adult Large)   Pulse 89   Temp 97.8  F (36.6  C) (Tympanic)   Resp 18   Ht 1.727 m (5' 7.99\")   Wt 104.8 kg (231 lb)   SpO2 98%   BMI 35.13 kg/m   Estimated body mass index is 35.13 kg/m  as calculated from the following:    Height as of this encounter: 1.727 m (5' 7.99\").    Weight as of this encounter: 104.8 kg (231 lb).  Patient presents to the clinic using No DME      Health Maintenance that is potentially due pending provider review:    Health Maintenance Due   Topic Date Due    ADVANCE CARE PLANNING  Never done    DEPRESSION ACTION PLAN  Never done    Pneumococcal Vaccine: Pediatrics (0 to 5 Years) and At-Risk Patients (6 to 64 Years) (1 of 2 - PCV) Never done    HIV SCREENING  Never done    HEPATITIS C SCREENING  Never done    YEARLY PREVENTIVE VISIT  08/08/2013    DIABETIC FOOT EXAM  04/15/2020    EYE EXAM  04/05/2022    A1C  05/11/2023    COVID-19 Vaccine (1 - 2023-24 season) Never done    BMP  11/11/2023    LIPID  11/11/2023    MICROALBUMIN  11/11/2023    ANNUAL REVIEW OF HM ORDERS  11/11/2023                  "

## 2024-06-19 ENCOUNTER — TELEPHONE (OUTPATIENT)
Dept: FAMILY MEDICINE | Facility: CLINIC | Age: 29
End: 2024-06-19
Payer: COMMERCIAL

## 2024-06-19 NOTE — TELEPHONE ENCOUNTER
"Patient called to get form updated per Ritika to process FMLA paperwork.    Question 7 and 8 needs to be more specific and need clarification on FMLA paperwork. Needs more information on duration of flare ups. Needs duration in hours per day noted. Please answer question 8 'if yes, explain\" and initial by any changes made to form.    Ritika contact number: [phone]     Form placed on Dr. Garcia desk. Please give to [facility] to fax when completed.    Patient needs this refaxed with updated information by July 1st.     Thanks,    Cami GREGORIO, RN      "

## 2024-06-19 NOTE — TELEPHONE ENCOUNTER
Forms updated by Dr. Cordero. Forms faxed. Copies made, sent to scanning. [employer] at Miley's desk.     Called and left detailed message on pt's identified voicemail.      Elise Colon Patient

## 2024-06-21 ENCOUNTER — TELEPHONE (OUTPATIENT)
Dept: FAMILY MEDICINE | Facility: CLINIC | Age: 29
End: 2024-06-21
Payer: COMMERCIAL

## 2024-06-21 NOTE — TELEPHONE ENCOUNTER
General Call      Reason for Call:  brandyn fmla specialist at  (pts employer) needing clarification on fmla ppw filled out by Consuelo. Brandyn has questions about question 8.  Hoping to get call back asap   And needing update by July 2nd.      What are your questions or concerns:  see above - brandyn needing clarification on number 8.      Date of last appointment with provider: 6/18    Could we send this information to you in [employer] or would you prefer to receive a phone call?:   Patient would prefer a phone call   Okay to leave a detailed message?: No at Other phone number:  fmla specialist at  -BRANDYN - [phone]

## 2024-06-21 NOTE — TELEPHONE ENCOUNTER
Spoke with [employer]. They are needing both the frequency and duration filled out.     Dr. Cordero wrote the frequency as 2-3 days per month.     Provider needs to initial and date changes.     Example of what [employer] is needing:  Frequency: 1 time per 1 month  Duration: 2-3 days per episode       Forms placed in PCP folder

## 2024-07-12 ENCOUNTER — OFFICE VISIT (OUTPATIENT)
Dept: FAMILY MEDICINE | Facility: CLINIC | Age: 29
End: 2024-07-12

## 2024-07-12 VITALS
TEMPERATURE: 98.3 F | OXYGEN SATURATION: 98 % | BODY MASS INDEX: 36.22 KG/M2 | RESPIRATION RATE: 16 BRPM | HEART RATE: 79 BPM | SYSTOLIC BLOOD PRESSURE: 124 MMHG | DIASTOLIC BLOOD PRESSURE: 78 MMHG | HEIGHT: 68 IN | WEIGHT: 239 LBS

## 2024-07-12 DIAGNOSIS — E10.9 TYPE 1 DIABETES MELLITUS WITHOUT COMPLICATION (H): Primary | ICD-10-CM

## 2024-07-12 DIAGNOSIS — I47.10 SVT (SUPRAVENTRICULAR TACHYCARDIA) (H): ICD-10-CM

## 2024-07-12 LAB — HBA1C MFR BLD: 7.1 % (ref 0–5.6)

## 2024-07-12 PROCEDURE — 36415 COLL VENOUS BLD VENIPUNCTURE: CPT | Performed by: PHYSICIAN ASSISTANT

## 2024-07-12 PROCEDURE — 99213 OFFICE O/P EST LOW 20 MIN: CPT | Performed by: PHYSICIAN ASSISTANT

## 2024-07-12 PROCEDURE — 83036 HEMOGLOBIN GLYCOSYLATED A1C: CPT | Performed by: PHYSICIAN ASSISTANT

## 2024-07-12 ASSESSMENT — PAIN SCALES - GENERAL: PAINLEVEL: NO PAIN (0)

## 2024-07-12 NOTE — LETTER
July 12, 2024      Tree Conti  [address] [facility] [place] [address]        To Whom It May Concern:    Tree Conti was seen in our clinic.   He had history of paroxysmal supraventricular tachycardia noted in 2015. He had workup through cardiology at that time and had Successful AVRT left sided pathway ablation on 8/18/16. He had normal EKG to follow up in 2016 and 2018.  He denies any current or recent cardiac symptoms, specifically chest pain, dyspnea, palpitations, presyncope/syncope, orthopnea, edema.  He is cleared to use respirator.        Sincerely,        Kaila Armendariz PA-C

## 2024-07-12 NOTE — LETTER
July 17, 2024      Tree Conti  [address] [place] [id number]        Dear ,    We are writing to inform you of your test results.   A1C looks good! Our  emailed the letters to you and the DOC.  Yajaira Armendariz PA-C      Resulted Orders   Hemoglobin A1c   Result Value Ref Range    Hemoglobin A1C 7.1 (H) 0.0 - 5.6 %      Comment:      Normal <5.7%   Prediabetes 5.7-6.4%    Diabetes 6.5% or higher     Note: Adopted from ADA consensus guidelines.       If you have any questions or concerns, please call the clinic at the number listed above.       Sincerely,      Kaila Armendariz PA-C

## 2024-07-12 NOTE — LETTER
July 12, 2024      Tree Conti  [address] [place] [id number]        To Whom It May Concern,     Component      Latest Ref Rng 7/12/2024  11:42 AM   Hemoglobin A1C      0.0 - 5.6 % 7.1 (H)       7.1 is at goal for a patient with type 1 diabetes.      Sincerely,        Kaila Armendariz PA-C

## 2024-07-12 NOTE — PROGRESS NOTES
Assessment & Plan     Type 1 diabetes mellitus without complication (H)  Well controlled A1C 7.1.  He currently has no insurance. He is in the hiring process for a new job with department of [employer]. Did discuss care coordinator referral if needed.  He declines other labwork. Recommended follow up when he has insurance for further labwork, refills, and endocrinology referral.  - Hemoglobin A1c; Future  - Hemoglobin A1c    SVT (supraventricular tachycardia) (H24)  Present [id number]. Reviewed ablation note from 8/18/16 and cardiology follow up 10/2016 noting Successful AVRT left sided pathway ablation. Reviewed two normal follow up EKGs after the ablation. He has had no recurrence of symptoms since the ablation. He denies any current or recent cardiac symptoms, specifically chest pain, dyspnea, palpitations, presyncope/syncope, orthopnea, edema.  Letter written to approve for respirator use.      Rashel Leavitt is a 28 year old, presenting for the following health issues:  Diabetes        7/12/2024    10:42 AM   Additional Questions   Roomed by ARIELLE Jeffery     History of Present Illness       Reason for visit:  Respirator clearance and a1c    He eats 0-1 servings of fruits and vegetables daily.He consumes 1 sweetened beverage(s) daily.He exercises with enough effort to increase his heart rate 9 or less minutes per day.  He exercises with enough effort to increase his heart rate 3 or less days per week.   He is taking medications regularly.     Forms- Pt requesting a letter for on boarding at new job. States he needs to wear a respirator and due to SVT treated with Ep ablation in 2016, job needs clearance letter.     Diabetes Follow-up    How often are you checking your blood sugar? Four or more times daily  Blood sugar testing frequency justification:  Risk of hypoglycemia with medication(s)  What time of day are you checking your blood sugars (select all that apply)?  Before and after meals  Have you had any  "blood sugars above 200?  Yes occasionally   Have you had any blood sugars below 70?  Yes occasionally - has carbohydrates around as needed  What symptoms do you notice when your blood sugar is low?  Weak and Blurred vision, confusion   What concerns do you have today about your diabetes? None   Do you have any of these symptoms? (Select all that apply)  No numbness or tingling in feet.  No redness, sores or blisters on feet.  No complaints of excessive thirst.  No reports of blurry vision.  No significant changes to weight.  Have you had a diabetic eye exam in the last 12 months? 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 For any cardiac history he needs letter of approval. Also Needs updated A1C.   In 2016 he had arrhythmia/ablation procedure. No further/recurrence of symptoms.      He is using lantus 35 units two times daily. Using novolog sliding scale  Blood sugars are \"good for the most part\" in the 100s    BP Readings from Last 2 Encounters:   07/12/24 124/78   06/18/24 132/88     Hemoglobin A1C (%)   Date Value   11/11/2022 7.3 (H)   01/08/2021 6.7 (H)   10/23/2019 7.1 (H)     LDL Cholesterol Calculated (mg/dL)   Date Value   11/11/2022 166 (H)   01/08/2021 134 (H)   04/06/2019 120 (H)               Other than noted above, general, HEENT, respiratory, cardiac, MS, and gastrointestinal systems are negative.       Objective    /78 (Cuff Size: Adult Large)   Pulse 79   Temp 98.3  F (36.8  C) (Tympanic)   Resp 16   Ht 1.727 m (5' 8\")   Wt 108.4 kg (239 lb)   SpO2 98%   BMI 36.34 kg/m    Body mass index is 36.34 kg/m .  Physical Exam   GENERAL: alert and no distress  RESP: lungs clear to auscultation - no rales, rhonchi or wheezes  CV: regular rate and rhythm, normal S1 S2, no S3 or S4, no murmur, click or rub, no peripheral edema  MS: no gross musculoskeletal defects noted, no edema    Results for orders placed or performed in visit on 07/12/24   Hemoglobin A1c     Status: Abnormal "   Result Value Ref Range    Hemoglobin A1C 7.1 (H) 0.0 - 5.6 %           Signed Electronically by: Kaila Armendariz PA-C

## 2024-09-21 DIAGNOSIS — E10.9 CONTROLLED TYPE 1 DIABETES MELLITUS WITHOUT COMPLICATION, WITH LONG-TERM CURRENT USE OF INSULIN (H): ICD-10-CM

## 2024-09-23 RX ORDER — INSULIN GLARGINE 100 [IU]/ML
INJECTION, SOLUTION SUBCUTANEOUS
Qty: 80 ML | Refills: 0 | Status: SHIPPED | OUTPATIENT
Start: 2024-09-23

## 2024-09-23 NOTE — TELEPHONE ENCOUNTER
Requested Prescriptions   Pending Prescriptions Disp Refills    insulin glargine (LANTUS VIAL) 100 UNIT/ML vial [Pharmacy Med Name: LANTUS 100 UNIT/ML VIAL] 80 mL 1     Sig: INJECT 35 UNITS TWICE DAILY-OK FOR ADJUSTMENT BACK TO 43 UNITS 2X/DAY.(MAX W/ PRIMING 89 UNIT/24 HR)       Insulin Protocol Failed - 9/21/2024  7:21 AM        Failed - Has GFR on file in past 12 months and most recent value is normal        Failed - Recent (6 mo) or future (90 days) visit within the authorizing provider's specialty     The patient must have completed an in-person or virtual visit within the past 6 months or has a future visit scheduled within the next 90 days with the authorizing provider s specialty.  Urgent care and e-visits do not quality as an office visit for this protocol.          Failed - Chart Review Required     Review Chart.    Do not approve if insulin is used in a pump.  Instead, direct refill request to the patient's endocrinologist.  If the patient doesn't have an endocrinologist, then send the refill to the patient's PCP for review            Passed - Medication is active on med list        Passed - Medication indicated for associated diagnosis     Medication is associated with one or more of the following diagnoses:   - Type 1 diabetes mellitus  - Type 2 diabetes mellitus  - Diabetic nephropathy; Prophylaxis  - Neuropathy due to diabetes mellitus; Prophylaxis  - Retinopathy due to diabetes mellitus; Prophylaxis  - Diabetes mellitus associated with cystic fibrosis  - Disorder of cardiovascular system; Prophylaxis - Type 1 diabetes mellitus   - Disorder of cardiovascular system; Prophylaxis - Type 2 diabetes mellitus            Passed - Patient is 18 years of age or older

## 2025-01-05 DIAGNOSIS — E10.9 CONTROLLED TYPE 1 DIABETES MELLITUS WITHOUT COMPLICATION, WITH LONG-TERM CURRENT USE OF INSULIN (H): ICD-10-CM

## 2025-01-06 RX ORDER — INSULIN GLARGINE 100 [IU]/ML
INJECTION, SOLUTION SUBCUTANEOUS
Qty: 80 ML | Refills: 0 | Status: SHIPPED | OUTPATIENT
Start: 2025-01-06

## 2025-01-06 RX ORDER — PEN NEEDLE, DIABETIC 29 G X1/2"
NEEDLE, DISPOSABLE MISCELLANEOUS
Qty: 600 EACH | Refills: 1 | Status: SHIPPED | OUTPATIENT
Start: 2025-01-06

## 2025-01-06 RX ORDER — INSULIN ASPART 100 [IU]/ML
INJECTION, SOLUTION INTRAVENOUS; SUBCUTANEOUS
Qty: 60 ML | Refills: 0 | Status: SHIPPED | OUTPATIENT
Start: 2025-01-06

## 2025-01-06 NOTE — TELEPHONE ENCOUNTER
Updated refill requests to add Lantus.    See 1/5 MyChart.    Niles FOLEY RN  [facility]

## 2025-02-01 ENCOUNTER — HEALTH MAINTENANCE LETTER (OUTPATIENT)
Age: 30
End: 2025-02-01

## 2025-03-17 ENCOUNTER — TRANSFERRED RECORDS (OUTPATIENT)
Dept: HEALTH INFORMATION MANAGEMENT | Facility: CLINIC | Age: 30
End: 2025-03-17
Payer: COMMERCIAL

## 2025-03-17 LAB — RETINOPATHY: NEGATIVE

## 2025-03-20 ENCOUNTER — PATIENT OUTREACH (OUTPATIENT)
Dept: FAMILY MEDICINE | Facility: CLINIC | Age: 30
End: 2025-03-20

## 2025-03-20 NOTE — TELEPHONE ENCOUNTER
Patient Quality Outreach    Patient is due for the following:   Diabetes -  A1C, Eye Exam, Diabetic Follow-Up Visit, and Foot Exam  Depression  -  Depression follow-up visit  Physical Preventive Adult Physical    Action(s) Taken:   Schedule a Adult Preventative, diabitic, depression follow up    Type of outreach:    Sent [employer] message.    Questions for provider review:    None         Rosi Frost, [facility]  Chart routed to Care Team.

## 2025-04-18 PROBLEM — E66.812 CLASS 2 SEVERE OBESITY WITH BODY MASS INDEX (BMI) OF 35 TO 39.9 WITH SERIOUS COMORBIDITY (H): Status: ACTIVE | Noted: 2025-04-18

## 2025-04-18 PROBLEM — F33.1 MODERATE EPISODE OF RECURRENT MAJOR DEPRESSIVE DISORDER (H): Status: ACTIVE | Noted: 2025-04-18

## 2025-04-18 PROBLEM — E66.01 CLASS 2 SEVERE OBESITY WITH BODY MASS INDEX (BMI) OF 35 TO 39.9 WITH SERIOUS COMORBIDITY (H): Status: ACTIVE | Noted: 2025-04-18

## 2025-04-22 ENCOUNTER — MYC MEDICAL ADVICE (OUTPATIENT)
Dept: FAMILY MEDICINE | Facility: CLINIC | Age: 30
End: 2025-04-22
Payer: COMMERCIAL

## 2025-04-23 ENCOUNTER — MYC MEDICAL ADVICE (OUTPATIENT)
Dept: FAMILY MEDICINE | Facility: CLINIC | Age: 30
End: 2025-04-23
Payer: COMMERCIAL

## 2025-04-23 ENCOUNTER — PATIENT OUTREACH (OUTPATIENT)
Dept: CARE COORDINATION | Facility: CLINIC | Age: 30
End: 2025-04-23
Payer: COMMERCIAL

## 2025-04-23 RX ORDER — ROSUVASTATIN CALCIUM 10 MG/1
10 TABLET, COATED ORAL DAILY
Status: CANCELLED | OUTPATIENT
Start: 2025-04-23

## 2025-06-09 DIAGNOSIS — E10.9 CONTROLLED TYPE 1 DIABETES MELLITUS WITHOUT COMPLICATION, WITH LONG-TERM CURRENT USE OF INSULIN (H): ICD-10-CM

## 2025-06-10 DIAGNOSIS — E10.9 CONTROLLED TYPE 1 DIABETES MELLITUS WITHOUT COMPLICATION, WITH LONG-TERM CURRENT USE OF INSULIN (H): ICD-10-CM

## 2025-06-10 RX ORDER — SYRINGE-NEEDLE,INSULIN,0.5 ML 27GX1/2"
SYRINGE, EMPTY DISPOSABLE MISCELLANEOUS
Qty: 200 EACH | Refills: 1 | OUTPATIENT
Start: 2025-06-10

## 2025-06-10 RX ORDER — SYRINGE-NEEDLE,INSULIN,0.5 ML 27GX1/2"
SYRINGE, EMPTY DISPOSABLE MISCELLANEOUS
Qty: 200 EACH | Refills: 1 | Status: SHIPPED | OUTPATIENT
Start: 2025-06-10

## 2025-07-02 DIAGNOSIS — E10.9 CONTROLLED TYPE 1 DIABETES MELLITUS WITHOUT COMPLICATION, WITH LONG-TERM CURRENT USE OF INSULIN (H): ICD-10-CM

## 2025-07-02 RX ORDER — SYRINGE-NEEDLE,INSULIN,0.5 ML 27GX1/2"
SYRINGE, EMPTY DISPOSABLE MISCELLANEOUS
Qty: 450 EACH | Refills: 3 | Status: SHIPPED | OUTPATIENT
Start: 2025-07-02

## 2025-07-02 NOTE — TELEPHONE ENCOUNTER
Received call from Patient.  Patient wondering about refills on the synges venu Armendariz leaves on maternity leave.   States that he uses 5-10 syringes per day depending on how often he eats and Blood glucose corrections.  Patient would like to get refill pn insulin syringes to last him until he is seen by DOMINGA Nash next April.  T'd up for provider.  Morgan FARRIS, Clinic RN   [facility]

## 2025-07-02 NOTE — TELEPHONE ENCOUNTER
He really needs to schedule to establish care with endocrinology. Typically would recommend fast acting insulin only 3 times daily then his longer acting two times daily.   Yajaira Armendariz PA-C